# Patient Record
Sex: FEMALE | Race: WHITE | Employment: OTHER | URBAN - METROPOLITAN AREA
[De-identification: names, ages, dates, MRNs, and addresses within clinical notes are randomized per-mention and may not be internally consistent; named-entity substitution may affect disease eponyms.]

---

## 2017-03-30 ENCOUNTER — HOSPITAL ENCOUNTER (OUTPATIENT)
Dept: MAMMOGRAPHY | Age: 67
Discharge: HOME | End: 2017-03-30
Attending: INTERNAL MEDICINE
Payer: MEDICARE

## 2017-03-30 DIAGNOSIS — Z12.31 VISIT FOR SCREENING MAMMOGRAM: ICD-10-CM

## 2017-03-30 PROCEDURE — 77063 BREAST TOMOSYNTHESIS BI: CPT

## 2017-04-06 ENCOUNTER — HOSPITAL ENCOUNTER (OUTPATIENT)
Dept: MAMMOGRAPHY | Age: 67
Discharge: HOME | End: 2017-04-06
Attending: INTERNAL MEDICINE
Payer: MEDICARE

## 2017-04-06 ENCOUNTER — HOSPITAL ENCOUNTER (OUTPATIENT)
Dept: ULTRASOUND IMAGING | Age: 67
Discharge: HOME | End: 2017-04-06
Attending: INTERNAL MEDICINE
Payer: MEDICARE

## 2017-04-06 DIAGNOSIS — N63.0 BREAST NODULE: ICD-10-CM

## 2017-04-06 PROCEDURE — 77065 DX MAMMO INCL CAD UNI: CPT

## 2017-04-06 PROCEDURE — 76642 ULTRASOUND BREAST LIMITED: CPT

## 2018-05-04 ENCOUNTER — HOSPITAL ENCOUNTER (OUTPATIENT)
Dept: MAMMOGRAPHY | Age: 68
Discharge: HOME | End: 2018-05-04
Attending: INTERNAL MEDICINE
Payer: MEDICARE

## 2018-05-04 DIAGNOSIS — Z12.31 VISIT FOR SCREENING MAMMOGRAM: ICD-10-CM

## 2018-05-04 PROCEDURE — 77063 BREAST TOMOSYNTHESIS BI: CPT

## 2018-09-19 ENCOUNTER — OFFICE VISIT (OUTPATIENT)
Dept: OBGYN CLINIC | Age: 68
End: 2018-09-19

## 2018-09-19 VITALS
BODY MASS INDEX: 38.3 KG/M2 | SYSTOLIC BLOOD PRESSURE: 117 MMHG | DIASTOLIC BLOOD PRESSURE: 72 MMHG | WEIGHT: 244 LBS | HEIGHT: 67 IN | HEART RATE: 66 BPM

## 2018-09-19 DIAGNOSIS — N83.8 OVARIAN MASS, LEFT: Primary | ICD-10-CM

## 2018-09-19 PROBLEM — E66.01 SEVERE OBESITY (BMI 35.0-39.9): Status: ACTIVE | Noted: 2018-09-19

## 2018-09-19 RX ORDER — METOPROLOL SUCCINATE 25 MG/1
25 TABLET, EXTENDED RELEASE ORAL DAILY
Refills: 0 | COMMUNITY
Start: 2018-09-01

## 2018-09-19 NOTE — PROGRESS NOTES
78 y/o  with h/o left leg pain presents to the office after an ovarian mass was seen on MRI. She states she has had leg pain before and her back was   Implicated as the issue. She has received several cortisone injection, but her new physician wanted an up to date MRI. She has no abdominal symptoms of pain, bloating, dyspepsia or any other concerns. She was referred to GYN for a 5 cm fatty mass on her left ovary. Right ovary wasn't seen. Active Ambulatory Problems     Diagnosis Date Noted    Atypical glandular cells of undetermined significance (MARIA TERESA) on cervical Pap smear 10/08/2015    Cervical polyp 10/15/2015    Severe obesity (BMI 35.0-39.9) ([facility] [address] 2018     Resolved Ambulatory Problems     Diagnosis Date Noted    No Resolved Ambulatory Problems     Past Medical History:   Diagnosis Date    Hypothyroid     Menopause      Visit Vitals    /72 (BP 1 Location: Left arm, BP Patient Position: Sitting)    Pulse 66    Ht 5' 7\" (1.702 m)    Wt 244 lb (110.7 kg)    BMI 38.22 kg/m2     Gen: A&Ox3, NAD  Abdomen: obese, soft, NT  SVE: NEFG, cervix appeared normal. No blood or discharge noted  BME: small, mobile uterus, no palpable adnexal mass, no pain illicit ed on exam.    MRI reviewed in detail with the patient:   4.2x5.4x4.3 cm left ovarian mass- fat filled and concerning for dermoid tumor. Right ovary not seen    A/P  78 y/o with an asymptomatic left ovarian mass-probable dermoid. Pt. Notes it wasn't seen on her previous MRI 10 years ago. TV-U/S ordered to re-assess. RTO 2 weeks to discuss ultrasound and plan of care. The plan of care has been discussed with the patient. She has been given the opportunity to ask questions, which seem to have been answered satisfactorily.

## 2018-09-19 NOTE — PATIENT INSTRUCTIONS
Noncancerous Ovarian Growths: Care Instructions  Your Care Instructions    Ovarian growths are abnormal growths in or on the ovaries. The growth can be a cyst, which is a fluid-filled sac, or a mass (neoplasm), which is a more solid growth. Most of these growths are not cancerous (benign) and don't cause symptoms. If you have symptoms, they may include pain in the belly or pelvis, pain during your period, and abnormal bleeding. Your doctor has examined you, and you have a noncancerous ovarian growth. Women and their doctors often choose to watch these types of growths closely over time but not to treat them. This is called watchful waiting. Your doctor may prescribe medicine to help with any symptoms. In some cases, noncancerous growths may need to be removed using surgery. Follow-up care is a key part of your treatment and safety. Be sure to make and go to all appointments, and call your doctor if you are having problems. It's also a good idea to know your test results and keep a list of the medicines you take. How can you care for yourself at home? · Use heat to relax tense muscles and relieve cramping. You can use a hot water bottle, a heating pad set on low, or a warm bath. · Be safe with medicines. Take pain medicines exactly as directed. ¨ If the doctor gave you a prescription medicine for pain, take it as prescribed. ¨ If you are not taking a prescription pain medicine, ask your doctor if you can take an over-the-counter medicine. · Avoid constipation. Make sure you drink enough fluids and include fruits, vegetables, and fiber in your diet each day. Constipation does not cause ovarian cysts, but it may make your pelvic pain worse. When should you call for help?   Call your doctor now or seek immediate medical care if:    · You have severe vaginal bleeding.     · You have new or worse belly or pelvic pain.    Watch closely for changes in your health, and be sure to contact your doctor if:    · You have unusual vaginal bleeding.     · You do not get better as expected. Where can you learn more? Go to http://bere-carlos.info/. Enter [phone] in the search box to learn more about \"Noncancerous Ovarian Growths: Care Instructions. \"  Current as of: October 6, 2017  Content Version: 11.7  © 1488-0543 [employer]. Care instructions adapted under license by [employer] (which disclaims liability or warranty for this information). If you have questions about a medical condition or this instruction, always ask your healthcare professional. [place] [address] any warranty or liability for your use of this information.

## 2018-09-28 ENCOUNTER — HOSPITAL ENCOUNTER (OUTPATIENT)
Dept: ULTRASOUND IMAGING | Age: 68
Discharge: HOME | End: 2018-09-28
Attending: OBSTETRICS & GYNECOLOGY
Payer: MEDICARE

## 2018-09-28 DIAGNOSIS — N83.8 OVARIAN MASS, LEFT: ICD-10-CM

## 2018-09-28 PROCEDURE — 76830 TRANSVAGINAL US NON-OB: CPT

## 2018-10-03 ENCOUNTER — OFFICE VISIT (OUTPATIENT)
Dept: OBGYN CLINIC | Age: 68
End: 2018-10-03

## 2018-10-03 VITALS
WEIGHT: 244 LBS | HEART RATE: 96 BPM | DIASTOLIC BLOOD PRESSURE: 66 MMHG | BODY MASS INDEX: 38.3 KG/M2 | HEIGHT: 67 IN | SYSTOLIC BLOOD PRESSURE: 112 MMHG

## 2018-10-03 DIAGNOSIS — N83.8 OVARIAN MASS, LEFT: Primary | ICD-10-CM

## 2018-10-03 NOTE — PATIENT INSTRUCTIONS
Pap Test: Care Instructions  Your Care Instructions    The Pap test (also called a Pap smear) is a screening test for cancer of the cervix, which is the lower part of the uterus that opens into the vagina. The test can help your doctor find early changes in the cells that could lead to cancer. The sample of cells taken during your test has been sent to a lab so that an expert can look at the cells. It usually takes a week or two to get the results back. Follow-up care is a key part of your treatment and safety. Be sure to make and go to all appointments, and call your doctor if you are having problems. It's also a good idea to know your test results and keep a list of the medicines you take. What do the results mean? · A normal result means that the test did not find any abnormal cells in the sample. · An abnormal result can mean many things. Most of these are not cancer. The results of your test may be abnormal because:  ¨ You have an infection of the vagina or cervix, such as a yeast infection. ¨ You have an IUD (intrauterine device for birth control). ¨ You have low estrogen levels after menopause that are causing the cells to change. ¨ You have cell changes that may be a sign of precancer or cancer. The results are ranked based on how serious the changes might be. There are many other reasons why you might not get a normal result. If the results were abnormal, you may need to get another test within a few weeks or months. If the results show changes that could be a sign of cancer, you may need a test called a colposcopy, which provides a more complete view of the cervix. Sometimes the lab cannot use the sample because it does not contain enough cells or was not preserved well. If so, you may need to have the test again. This is not common, but it does happen from time to time. When should you call for help?   Watch closely for changes in your health, and be sure to contact your doctor if:    · You have vaginal bleeding or pain for more than 2 days after the test. It is normal to have a small amount of bleeding for a day or two after the test.   Where can you learn more? Go to http://bere-carlos.info/. Enter D296 in the search box to learn more about \"Pap Test: Care Instructions. \"  Current as of: May 12, 2017  Content Version: 11.7  © 2006-2018 [employer]. Care instructions adapted under license by [employer] (which disclaims liability or warranty for this information). If you have questions about a medical condition or this instruction, always ask your healthcare professional. [address] any warranty or liability for your use of this information. Endometrial Biopsy: About This Test  What is it? An endometrial biopsy is a way for your doctor to take a small sample of the lining of the uterus (endometrium). The sample is looked at under a microscope for abnormal cells. An endometrial biopsy helps your doctor find problems in the endometrium. Why is this test done? An endometrial biopsy is done to check for cancer of the uterus. The test is also done if you have abnormal bleeding from your uterus or are having problems getting pregnant. The test results show how your body's hormones are affecting the lining of the uterus. How can you prepare for the test?  Talk to your doctor about all your health conditions before the test. For example, tell your doctor if you:  · Are or might be pregnant. An endometrial biopsy is not done during pregnancy. · Are taking any medicines. · Are allergic to any medicines. · Have had bleeding problems, or if you take aspirin or some other blood thinner. · Have been treated for an infection in your pelvic area. · Have any heart or lung problems.   Other ways to prepare:  · Do not douche, use tampons, or use vaginal medicines for 24 hours before the test.  · Ask your doctor if you should take a pain reliever, such as ibuprofen (Advil or Motrin), 30 to 60 minutes before the test. This can help reduce any cramping pain that the test can cause. · Talk to your doctor if you have concerns about the need for the test, its risks, how it will be done, or what the results may mean. What happens before the test?  · You will empty your bladder just before the test.  · You will be asked to sign a consent form that says you understand the risks of the test and agree to have it done. What happens during the test?  · You will lie on an exam table. Your feet will be in stirrups. · The doctor may use a spray or injection to numb your cervix. The cervix is the opening to the uterus. · The doctor will use a tool called a speculum to see the cervix. · Then the doctor will pass a thin tube through the cervix to take a sample of the uterus lining. You may feel a sharp cramp when the doctor collects the sample. · The sample is sent to a lab. How long does the test take? The test will take about 5 to 15 minutes. What happens after the test?  · You will probably be able to go home right away. · You likely will have mild vaginal bleeding and may have cramps for a few days after the test. The cramps may feel like bad menstrual cramps. · Ask your doctor if you can take an over-the-counter pain medicine, such as acetaminophen (Tylenol), ibuprofen (Advil, Motrin), or naproxen (Aleve). Be safe with medicines. Read and follow all instructions on the label. · Do not have sex, use tampons, or douche until the spotting stops. Use pads for vaginal bleeding or discharge. · Do not do strenuous exercise or heavy lifting for one day after your biopsy. Follow-up care is a key part of your treatment and safety. Be sure to make and go to all appointments, and call your doctor if you are having problems. It's also a good idea to keep a list of the medicines you take. Ask your doctor when you can expect to have your test results.   Where can you learn more? Go to http://bere-carlos.info/. Enter [phone] in the search box to learn more about \"Endometrial Biopsy: About This Test.\"  Current as of: October 6, 2017  Content Version: 11.7  © 6371-2186 [employer], [employer]. Care instructions adapted under license by [employer] (which disclaims liability or warranty for this information). If you have questions about a medical condition or this instruction, always ask your healthcare professional. [address] any warranty or liability for your use of this information.

## 2018-10-03 NOTE — PROGRESS NOTES
76 y/o  presents to the office for follow-up. She has no complaints. She denies any vaginal bleeding or pain. No changes in her medical record. ,Blood pressure 112/66, pulse 96, height 5' 7\" (1.702 m), weight 244 lb (110.7 kg). Gen: A&OX3, NAD  Exam deferred    Ultrasound: UTERUS: Normal in size and echotexture measuring  7.0 x 4.8 x 3.9 cm. No  fibroids or masses identified.     ENDOMETRIUM: Normal in echotexture, measuring 11 millimeters     RIGHT OVARY and ADNEXA: The right ovary is nonvisualized.     LEFT OVARY and ADNEXA: The left ovary is difficult to visualized in both  transabdominal and transvaginal acoustic windows. Structure measured as the left  ovary noted with dimensions of approximately 5.9 x 4.3 x 4.8 cm. Mixed  echogenicity lesion with associated calcification present measuring  approximately 5.7 x 5.2 x 4.7 cm in size. Color Doppler and spectral imaging  demonstrates normal venous blood flow to the left ovary, without discrete  arterial waveforms noted.     OTHER: No free fluid identified. A/P:  76 y/o with a dermoid tumor- and thickened endometrium. Discussed recommendation for endometrial sampling. Will obtain tumor markers, but low concern for malignancy. RTO 2 weeks for EMB. The plan of care has been discussed with the patient. She has been given the opportunity to ask questions, which seem to have been answered satisfactorily.

## 2018-10-04 PROBLEM — R93.89 THICKENED ENDOMETRIUM: Status: ACTIVE | Noted: 2018-10-04

## 2018-10-04 PROBLEM — N83.8 OVARIAN MASS, LEFT: Status: ACTIVE | Noted: 2018-10-04

## 2018-10-17 ENCOUNTER — HOSPITAL ENCOUNTER (OUTPATIENT)
Dept: LAB | Age: 68
Discharge: HOME | End: 2018-10-17
Payer: MEDICARE

## 2018-10-17 ENCOUNTER — OFFICE VISIT (OUTPATIENT)
Dept: OBGYN CLINIC | Age: 68
End: 2018-10-17

## 2018-10-17 ENCOUNTER — HOSPITAL ENCOUNTER (OUTPATIENT)
Dept: LAB | Age: 68
Discharge: HOME | End: 2018-10-17

## 2018-10-17 VITALS
WEIGHT: 244 LBS | HEIGHT: 67 IN | DIASTOLIC BLOOD PRESSURE: 72 MMHG | HEART RATE: 81 BPM | SYSTOLIC BLOOD PRESSURE: 120 MMHG | BODY MASS INDEX: 38.3 KG/M2

## 2018-10-17 DIAGNOSIS — N95.0 POSTMENOPAUSAL BLEEDING: Primary | ICD-10-CM

## 2018-10-17 PROCEDURE — 87624 HPV HI-RISK TYP POOLED RSLT: CPT | Performed by: OBSTETRICS & GYNECOLOGY

## 2018-10-17 PROCEDURE — 99001 SPECIMEN HANDLING PT-LAB: CPT | Performed by: OBSTETRICS & GYNECOLOGY

## 2018-10-17 PROCEDURE — 88305 TISSUE EXAM BY PATHOLOGIST: CPT | Performed by: OBSTETRICS & GYNECOLOGY

## 2018-10-17 PROCEDURE — 88175 CYTOPATH C/V AUTO FLUID REDO: CPT | Performed by: OBSTETRICS & GYNECOLOGY

## 2018-10-17 NOTE — PROCEDURES
Endometrial Biopsy Procedure Note    Time Out: 1126    Endometrial biopsy was performed today. Indications: abnormal uterine bleeding    Pre-procedural pain: 0:10    Procedure Details   Urine pregnancy test was not done. The risks (including infection, bleeding, pain, and uterine perforation) and benefits of the procedure were explained to the her and written informed consent was obtained. Antibiotic prophylaxis against endocarditis was not indicated. She was placed in the dorsal lithotomy position. Bimanual exam showed the uterus to be in the neutral position. A speculum inserted in the vagina, and the cervix prepped with povidone iodine. A \"Pipelle endometrial aspirator\" was used to sample the endometrium. Sample was sent for pathologic examination. The patient tolerated the procedure well and there were no complications. Post procedure pain: 0:10  End Time: 5675    Plan:  Ms. Aydee Soto was advised to call for any fever or for prolonged or severe pain or bleeding. She was advised to use OTC ibuprofen as needed for mild to moderate pain. She was advised to avoid vaginal intercourse for 48 hours or until the bleeding has completely stopped. My office will get in touch with her as soon as we have the pathology report.

## 2018-10-17 NOTE — PROGRESS NOTES
78 y/o with a thickened endometrium and left adnexal mass most c/w fibroid, presents to the office for an EMB. She has no concerns and denies any bleeding. Visit Vitals  /72   Pulse 81   Ht 5' 7\" (1.702 m)   Wt 244 lb (110.7 kg)   BMI 38.22 kg/m²     Exam:Normal appearing cervix. Pap smear taken prior to EMB. See full procedure note for details    A/P  EMB today for thickened endometrium- asymptomatic. Will plan for laparoscopic left salping-oopherectomy. Pre-op clearacne form given. RTO 2 weeks for results. Pap also done today since not done in many years.

## 2018-10-18 LAB
CANCER AG125 SERPL-ACNC: 18.5 U/ML (ref 0–38.1)
CANCER AG19-9 SERPL-ACNC: 18 U/ML (ref 0–35)
CEA SERPL-MCNC: 11.7 NG/ML (ref 0–4.7)
LDH SERPL-CCNC: 232 IU/L (ref 119–226)

## 2018-11-05 ENCOUNTER — OFFICE VISIT (OUTPATIENT)
Dept: OBGYN CLINIC | Age: 68
End: 2018-11-05

## 2018-11-05 VITALS
HEIGHT: 67 IN | DIASTOLIC BLOOD PRESSURE: 61 MMHG | SYSTOLIC BLOOD PRESSURE: 110 MMHG | HEART RATE: 62 BPM | RESPIRATION RATE: 20 BRPM | WEIGHT: 244 LBS | BODY MASS INDEX: 38.3 KG/M2 | OXYGEN SATURATION: 100 %

## 2018-11-05 DIAGNOSIS — N83.8 OVARIAN MASS, LEFT: Primary | ICD-10-CM

## 2018-11-05 NOTE — PROGRESS NOTES
76 y/o with thickened endometrium and right ovarian mass concerning for dermoid tumor. The patient notes no concern post EMB. She denies any pain or bleeding. Her daughter is present during this visit. Visit Vitals  /61 (BP 1 Location: Left arm, BP Patient Position: Sitting)   Pulse 62   Resp 20   Ht 5' 7\" (1.702 m)   Wt 244 lb (110.7 kg)   SpO2 100%   BMI 38.22 kg/m²     Exam: deferred    Pathology:  Endometrium negative for hyperplasia or carcinoma    A/P: 76 y/o with previous abnormal pap. Recent pap smear normal.  EMB done for PMB- pathology negative. Right ovarian mass- concerning for ectopic. Will plan for removal post surgical clearance. Form given today.

## 2018-11-26 ENCOUNTER — OFFICE VISIT (OUTPATIENT)
Dept: OBGYN CLINIC | Age: 68
End: 2018-11-26

## 2018-11-26 ENCOUNTER — HOSPITAL ENCOUNTER (OUTPATIENT)
Dept: PREADMISSION TESTING | Age: 68
Discharge: HOME | End: 2018-11-26
Payer: MEDICARE

## 2018-11-26 VITALS
HEART RATE: 67 BPM | WEIGHT: 243 LBS | BODY MASS INDEX: 38.06 KG/M2 | SYSTOLIC BLOOD PRESSURE: 130 MMHG | DIASTOLIC BLOOD PRESSURE: 67 MMHG

## 2018-11-26 DIAGNOSIS — Z01.818 PRE-OP EVALUATION: Primary | ICD-10-CM

## 2018-11-26 DIAGNOSIS — Z01.818 PRE-OP EVALUATION: ICD-10-CM

## 2018-11-26 PROBLEM — I47.1 SVT (SUPRAVENTRICULAR TACHYCARDIA) (HCC): Status: ACTIVE | Noted: 2018-11-26

## 2018-11-26 LAB
ABO + RH BLD: NORMAL
BLOOD GROUP ANTIBODIES SERPL: NORMAL
SPECIMEN EXP DATE BLD: NORMAL

## 2018-11-26 PROCEDURE — 36415 COLL VENOUS BLD VENIPUNCTURE: CPT

## 2018-11-26 PROCEDURE — 86901 BLOOD TYPING SEROLOGIC RH(D): CPT

## 2018-11-26 RX ORDER — LEVOTHYROXINE SODIUM 125 UG/1
125 TABLET ORAL DAILY
Refills: 0 | COMMUNITY
Start: 2018-09-06 | End: 2019-08-29

## 2018-11-26 RX ORDER — METFORMIN HYDROCHLORIDE 500 MG/1
500 TABLET ORAL 2 TIMES DAILY WITH MEALS
COMMUNITY

## 2018-11-26 NOTE — PERIOP NOTES
PAT - SURGICAL PRE-ADMISSION INSTRUCTIONS 
 
NAME:  Deangelo Carlton                                                          TODAY'S DATE:  11/26/2018 SURGERY DATE:  11/28/2018                                  SURGERY ARRIVAL TIME:   0900 1. Do NOT eat or drink anything, including candy or gum, after MIDNIGHT on 11/27/18 , unless you have specific instructions from your Surgeon or Anesthesia Provider to do so. 2. No smoking on the day of surgery. 3. No alcohol 24 hours prior to the day of surgery. 4. No recreational drugs for one week prior to the day of surgery. 5. Leave all valuables, including money/purse, at home. 6. Remove all jewelry, nail polish, makeup (including mascara); no lotions, powders, deodorant, or perfume/cologne/after shave. 7. Glasses/Contact lenses and Dentures may be worn to the hospital.  They will be removed prior to surgery. 8. Call your doctor if symptoms of a cold or illness develop within 24 ours prior to surgery. 9. AN ADULT MUST DRIVE YOU HOME AFTER OUTPATIENT SURGERY. 10. If you are having an OUTPATIENT procedure, please make arrangements for a responsible adult to be with you for 24 hours after your surgery. 11. If you are admitted to the hospital, you will be assigned to a bed after surgery is complete. Normally a family member will not be able to see you until you are in your assigned bed. 15. Family is encouraged to accompany you to the hospital.  We ask visitors in the treatment area to be limited to ONE person at a time to ensure patient privacy. EXCEPTIONS WILL BE MADE AS NEEDED. 15. Children under 12 are discouraged from entering the treatment area and need to be supervised by an adult when in the waiting room. Special Instructions: Take these medications the morning of surgery with a sip of water:  Thyroid Patient Prep: 
 
use CHG solution These surgical instructions were reviewed with Bry Bridges during the PAT visit. A printed copy of the instructions was provided to MAUREEN. Directions: On the morning of surgery, please go to the [address] [facility]. Enter the building from the [facility] entrance, 1st floor (next to the Emergency Room entrance). Take the elevator to the 2nd floor. Sign in at the Registration Desk. If you have any questions and/or concerns, please do not hesitate to call: 
(Prior to the day of surgery)  [facility] unit:  [phone] (Day of surgery)  [facility] unit:  [phone]

## 2018-11-27 ENCOUNTER — ANESTHESIA EVENT (OUTPATIENT)
Dept: SURGERY | Age: 68
End: 2018-11-27
Payer: MEDICARE

## 2018-11-27 NOTE — H&P (VIEW-ONLY)
Preoperative Evaluation Date of Exam: 11/26/2018 Soheila Jacobo is a 77 y.o. female ([phone]) who presents for preoperative evaluation. Procedure/Surgery:Diagnostic hysteroscopy and Dilation and curretage; Diagnostic laparoscopy; Left salping-oopherectomy Date of Procedure/Surgery: November 28, 2018 Surgeon: CASIMIRO Centeno MD 
Hospital/Surgical Facility: [facility] Primary Physician: Mendel Centers, MD 
Latex Allergy: no 
 
Problem List:    
Patient Active Problem List  
 Diagnosis Date Noted  SVT (supraventricular tachycardia) ([facility] [address] 11/26/2018  Thickened endometrium 10/04/2018  Ovarian mass, left 10/04/2018  Severe obesity (BMI 35.0-39.9) 09/19/2018  Cervical polyp 10/15/2015  Atypical glandular cells of undetermined significance (MARIA TERESA) on cervical Pap smear 10/08/2015 Medical History:    
Past Medical History:  
Diagnosis Date  Diabetes ([facility] [address]  Hypercholesteremia  Hypertension  Hypothyroid  Menopause  Ovarian mass, left 10/4/2018  SVT (supraventricular tachycardia) ([facility] [address] 11/26/2018  Thickened endometrium 10/4/2018 Allergies:   No Known Allergies Medications:    
Current Outpatient Medications Medication Sig  
 metFORMIN (GLUCOPHAGE) 500 mg tablet Take 500 mg by mouth daily (with breakfast).  levothyroxine (SYNTHROID) 125 mcg tablet Take 125 mcg by mouth daily.  metoprolol succinate (TOPROL-XL) 25 mg XL tablet Take 25 mg by mouth daily.  simvastatin (ZOCOR) 20 mg tablet No current facility-administered medications for this visit. Surgical History:    
Past Surgical History:  
Procedure Laterality Date  HX TONSILLECTOMY Social History:    
Social History Socioeconomic History  Marital status:  Spouse name: Not on file  Number of children: Not on file  Years of education: Not on file  Highest education level: Not on file Tobacco Use  Smoking status: Current Some Day Smoker Packs/day: 1.00 Years: 56.00 Pack years: 56.00  Smokeless tobacco: Never Used Substance and Sexual Activity  Alcohol use: No  
  Alcohol/week: 0.0 oz  Drug use: No  
 Sexual activity: No  
  Birth control/protection: None Recent use of: No recent use of aspirin (ASA), NSAIDS or steroids Tetanus up to date: tetanus status unknown to the patient Anesthesia Complications: None History of abnormal bleeding : None History of Blood Transfusions: no 
Health Care Directive or Living Will: no 
 
REVIEW OF SYSTEMS: 
A comprehensive review of systems was negative except for that written in the HPI. EXAM:  
Visit Vitals /67 (BP 1 Location: Left arm, BP Patient Position: Sitting) Pulse 67 Wt 243 lb (110.2 kg) BMI 38.06 kg/m² General appearance - alert, well appearing, and in no distress, normal appearing weight and overweight Mental status - alert, oriented to person, place, and time Chest - clear to auscultation, no wheezes, rales or rhonchi, symmetric air entry Heart - normal rate, regular rhythm, normal S1, S2, no murmurs, rubs, clicks or gallops Abdomen - soft, nontender, nondistended, no masses or organomegaly Pelvic - deferred Extremities - peripheral pulses normal, no pedal edema, no clubbing or cyanosis Skin - normal coloration and turgor, no rashes, no suspicious skin lesions noted DIAGNOSTICS:  
1. EKG: EKG FINDINGS - unchanged from previous tracings, sinus tachycardia 2. CXR: deferred 3. Labs: ABO/Rh(D) Date Value Ref Range Status 11/26/2018 A POSITIVE  Final  
 
Labs: 
See pre-op clearance in media IMPRESSION:  
Dermoid tumor- long standing. Will plan for laparoscopic removal. 
Thickened endometrium-postmenopausal.  
Pt. Consented for Diagnostic laparoscopy and hysteroscopy. Pt has been cleared by the PCP. Pre and postoperative instructions reviewed reviewed. The plan of care has been discussed with the patient. She has been given the opportunity to ask questions, which seem to have been answered satisfactorily. No contraindications to planned surgery Irene Lee MD  
11/26/2018

## 2018-11-27 NOTE — PROGRESS NOTES
Preoperative Evaluation    Date of Exam: 2018    Antonio Jesus is a 77 y.o. female (:1950) who presents for preoperative evaluation. Procedure/Surgery:Diagnostic hysteroscopy and Dilation and curretage; Diagnostic laparoscopy; Left salping-oopherectomy  Date of Procedure/Surgery: 2018  Surgeon: CASIMIRO Leigh MD  Hospital/Surgical Facility: [facility]  Primary Physician: Josh Morgan MD  Latex Allergy: no    Problem List:     Patient Active Problem List    Diagnosis Date Noted    SVT (supraventricular tachycardia) ([facility] 2018    Thickened endometrium 10/04/2018    Ovarian mass, left 10/04/2018    Severe obesity (BMI 35.0-39.9) 2018    Cervical polyp 10/15/2015    Atypical glandular cells of undetermined significance ([facility]) on cervical Pap smear 10/08/2015     Medical History:     Past Medical History:   Diagnosis Date    Diabetes ([facility]     Hypercholesteremia     Hypertension     Hypothyroid     Menopause     Ovarian mass, left 10/4/2018    SVT (supraventricular tachycardia) ([facility] [facility] 2018    Thickened endometrium 10/4/2018     Allergies:   No Known Allergies   Medications:     Current Outpatient Medications   Medication Sig    metFORMIN (GLUCOPHAGE) 500 mg tablet Take 500 mg by mouth daily (with breakfast).  levothyroxine (SYNTHROID) 125 mcg tablet Take 125 mcg by mouth daily.  metoprolol succinate (TOPROL-XL) 25 mg XL tablet Take 25 mg by mouth daily.  simvastatin (ZOCOR) 20 mg tablet      No current facility-administered medications for this visit.       Surgical History:     Past Surgical History:   Procedure Laterality Date    HX TONSILLECTOMY       Social History:     Social History     Socioeconomic History    Marital status:      Spouse name: Not on file    Number of children: Not on file    Years of education: Not on file    Highest education level: Not on file   Tobacco Use    Smoking status: Current Some Day Smoker Packs/day: 1.00     Years: 56.00     Pack years: 56.00    Smokeless tobacco: Never Used   Substance and Sexual Activity    Alcohol use: No     Alcohol/week: 0.0 oz    Drug use: No    Sexual activity: No     Birth control/protection: None       Recent use of: No recent use of aspirin (ASA), NSAIDS or steroids    Tetanus up to date: tetanus status unknown to the patient      Anesthesia Complications: None  History of abnormal bleeding : None  History of Blood Transfusions: no  Health Care Directive or Living Will: no    REVIEW OF SYSTEMS:  A comprehensive review of systems was negative except for that written in the HPI. EXAM:   Visit Vitals  /67 (BP 1 Location: Left arm, BP Patient Position: Sitting)   Pulse 67   Wt 243 lb (110.2 kg)   BMI 38.06 kg/m²     General appearance - alert, well appearing, and in no distress, normal appearing weight and overweight  Mental status - alert, oriented to person, place, and time  Chest - clear to auscultation, no wheezes, rales or rhonchi, symmetric air entry  Heart - normal rate, regular rhythm, normal S1, S2, no murmurs, rubs, clicks or gallops  Abdomen - soft, nontender, nondistended, no masses or organomegaly  Pelvic - deferred  Extremities - peripheral pulses normal, no pedal edema, no clubbing or cyanosis  Skin - normal coloration and turgor, no rashes, no suspicious skin lesions noted      DIAGNOSTICS:   1. EKG: EKG FINDINGS - unchanged from previous tracings, sinus tachycardia  2. CXR: deferred  3. Labs:  ABO/Rh(D)   Date Value Ref Range Status   11/26/2018 A POSITIVE  Final     Labs:  See pre-op clearance in media    IMPRESSION:   Dermoid tumor- long standing. Will plan for laparoscopic removal.  Thickened endometrium-postmenopausal.   Pt. Consented for Diagnostic laparoscopy and hysteroscopy. Pt has been cleared by the PCP. Pre and postoperative instructions reviewed reviewed. The plan of care has been discussed with the patient.   She has been given the opportunity to ask questions, which seem to have been answered satisfactorily.     No contraindications to planned surgery    Taniya Dooley MD   11/26/2018

## 2018-11-28 ENCOUNTER — HOSPITAL ENCOUNTER (OUTPATIENT)
Age: 68
Setting detail: OUTPATIENT SURGERY
Discharge: HOME | End: 2018-11-28
Attending: OBSTETRICS & GYNECOLOGY | Admitting: OBSTETRICS & GYNECOLOGY
Payer: MEDICARE

## 2018-11-28 ENCOUNTER — ANESTHESIA (OUTPATIENT)
Dept: SURGERY | Age: 68
End: 2018-11-28
Payer: MEDICARE

## 2018-11-28 VITALS
SYSTOLIC BLOOD PRESSURE: 117 MMHG | WEIGHT: 237 LBS | BODY MASS INDEX: 37.2 KG/M2 | TEMPERATURE: 97.4 F | DIASTOLIC BLOOD PRESSURE: 53 MMHG | HEIGHT: 67 IN | RESPIRATION RATE: 18 BRPM | OXYGEN SATURATION: 96 % | HEART RATE: 69 BPM

## 2018-11-28 DIAGNOSIS — Z98.890 S/P LAPAROSCOPY: Primary | ICD-10-CM

## 2018-11-28 PROBLEM — E11.9 CONTROLLED TYPE 2 DIABETES MELLITUS WITHOUT COMPLICATION (HCC): Status: ACTIVE | Noted: 2018-11-28

## 2018-11-28 LAB
ANION GAP SERPL CALC-SCNC: 9 MMOL/L (ref 3–18)
BASOPHILS # BLD: 0 K/UL (ref 0–0.1)
BASOPHILS NFR BLD: 0 % (ref 0–2)
BUN SERPL-MCNC: 18 MG/DL (ref 7–18)
BUN/CREAT SERPL: 20 (ref 12–20)
CALCIUM SERPL-MCNC: 8.6 MG/DL (ref 8.5–10.1)
CHLORIDE SERPL-SCNC: 108 MMOL/L (ref 100–108)
CO2 SERPL-SCNC: 26 MMOL/L (ref 21–32)
CREAT SERPL-MCNC: 0.88 MG/DL (ref 0.6–1.3)
DIFFERENTIAL METHOD BLD: NORMAL
EOSINOPHIL # BLD: 0.1 K/UL (ref 0–0.4)
EOSINOPHIL NFR BLD: 1 % (ref 0–5)
ERYTHROCYTE [DISTWIDTH] IN BLOOD BY AUTOMATED COUNT: 14 % (ref 11.6–14.5)
GLUCOSE BLD STRIP.AUTO-MCNC: 160 MG/DL (ref 70–110)
GLUCOSE SERPL-MCNC: 142 MG/DL (ref 74–99)
HCT VFR BLD AUTO: 42.7 % (ref 35–45)
HGB BLD-MCNC: 14.3 G/DL (ref 12–16)
LYMPHOCYTES # BLD: 3.5 K/UL (ref 0.9–3.6)
LYMPHOCYTES NFR BLD: 40 % (ref 21–52)
MCH RBC QN AUTO: 31 PG (ref 24–34)
MCHC RBC AUTO-ENTMCNC: 33.5 G/DL (ref 31–37)
MCV RBC AUTO: 92.4 FL (ref 74–97)
MONOCYTES # BLD: 0.8 K/UL (ref 0.05–1.2)
MONOCYTES NFR BLD: 9 % (ref 3–10)
NEUTS SEG # BLD: 4.3 K/UL (ref 1.8–8)
NEUTS SEG NFR BLD: 50 % (ref 40–73)
PLATELET # BLD AUTO: 254 K/UL (ref 135–420)
PMV BLD AUTO: 10.6 FL (ref 9.2–11.8)
POTASSIUM SERPL-SCNC: 4.3 MMOL/L (ref 3.5–5.5)
RBC # BLD AUTO: 4.62 M/UL (ref 4.2–5.3)
SODIUM SERPL-SCNC: 143 MMOL/L (ref 136–145)
WBC # BLD AUTO: 8.7 K/UL (ref 4.6–13.2)

## 2018-11-28 PROCEDURE — 88309 TISSUE EXAM BY PATHOLOGIST: CPT

## 2018-11-28 PROCEDURE — 74011000250 HC RX REV CODE- 250: Performed by: OBSTETRICS & GYNECOLOGY

## 2018-11-28 PROCEDURE — 77030016151 HC PROTCTR LNS DFOG COVD -B: Performed by: OBSTETRICS & GYNECOLOGY

## 2018-11-28 PROCEDURE — 74011000250 HC RX REV CODE- 250

## 2018-11-28 PROCEDURE — 74011250637 HC RX REV CODE- 250/637

## 2018-11-28 PROCEDURE — 88307 TISSUE EXAM BY PATHOLOGIST: CPT

## 2018-11-28 PROCEDURE — 74011250636 HC RX REV CODE- 250/636: Performed by: NURSE ANESTHETIST, CERTIFIED REGISTERED

## 2018-11-28 PROCEDURE — 74011250637 HC RX REV CODE- 250/637: Performed by: NURSE ANESTHETIST, CERTIFIED REGISTERED

## 2018-11-28 PROCEDURE — C1765 ADHESION BARRIER: HCPCS | Performed by: OBSTETRICS & GYNECOLOGY

## 2018-11-28 PROCEDURE — 74011250636 HC RX REV CODE- 250/636

## 2018-11-28 PROCEDURE — 77030002933 HC SUT MCRYL J&J -A: Performed by: OBSTETRICS & GYNECOLOGY

## 2018-11-28 PROCEDURE — 77030032151 HC HYSTSCP FLD MGMT KT DISP S&N -D: Performed by: OBSTETRICS & GYNECOLOGY

## 2018-11-28 PROCEDURE — 77030035048 HC TRCR ENDOSC OPTCL COVD -B: Performed by: OBSTETRICS & GYNECOLOGY

## 2018-11-28 PROCEDURE — 77030032490 HC SLV COMPR SCD KNE COVD -B: Performed by: OBSTETRICS & GYNECOLOGY

## 2018-11-28 PROCEDURE — 77030008603 HC TRCR ENDOSC EPATH J&J -C: Performed by: OBSTETRICS & GYNECOLOGY

## 2018-11-28 PROCEDURE — 77030018720 HC DVC LIGSR ATLS COVD -E: Performed by: OBSTETRICS & GYNECOLOGY

## 2018-11-28 PROCEDURE — 77030033137 HC TBNG OUTFLO AQUILEX ST HOLO -B: Performed by: OBSTETRICS & GYNECOLOGY

## 2018-11-28 PROCEDURE — 74011250636 HC RX REV CODE- 250/636: Performed by: OBSTETRICS & GYNECOLOGY

## 2018-11-28 PROCEDURE — 77030008518 HC TBNG INSUF ENDO STRY -B: Performed by: OBSTETRICS & GYNECOLOGY

## 2018-11-28 PROCEDURE — 77030009852 HC PCH RTVR ENDOSC COVD -B: Performed by: OBSTETRICS & GYNECOLOGY

## 2018-11-28 PROCEDURE — 77030035051: Performed by: OBSTETRICS & GYNECOLOGY

## 2018-11-28 PROCEDURE — 77030018842 HC SOL IRR SOD CL 9% BAXT -A: Performed by: OBSTETRICS & GYNECOLOGY

## 2018-11-28 PROCEDURE — 77030033136 HC TBNG INFLO AQUILEX ST HOLO -C: Performed by: OBSTETRICS & GYNECOLOGY

## 2018-11-28 PROCEDURE — 82962 GLUCOSE BLOOD TEST: CPT

## 2018-11-28 PROCEDURE — 85025 COMPLETE CBC W/AUTO DIFF WBC: CPT

## 2018-11-28 PROCEDURE — 76010000135 HC OR TIME 4 TO 4.5 HR: Performed by: OBSTETRICS & GYNECOLOGY

## 2018-11-28 PROCEDURE — 77030008598 HC TRCR ENDOSC BLDLS J&J -B: Performed by: OBSTETRICS & GYNECOLOGY

## 2018-11-28 PROCEDURE — 76060000039 HC ANESTHESIA 4 TO 4.5 HR: Performed by: OBSTETRICS & GYNECOLOGY

## 2018-11-28 PROCEDURE — 77030026236 HC DEV TISS RMVL MYOSUR HOLO -G1: Performed by: OBSTETRICS & GYNECOLOGY

## 2018-11-28 PROCEDURE — 77030031139 HC SUT VCRL2 J&J -A: Performed by: OBSTETRICS & GYNECOLOGY

## 2018-11-28 PROCEDURE — 93005 ELECTROCARDIOGRAM TRACING: CPT

## 2018-11-28 PROCEDURE — 76210000006 HC OR PH I REC 0.5 TO 1 HR: Performed by: OBSTETRICS & GYNECOLOGY

## 2018-11-28 PROCEDURE — 76210000021 HC REC RM PH II 0.5 TO 1 HR: Performed by: OBSTETRICS & GYNECOLOGY

## 2018-11-28 PROCEDURE — 77030034850: Performed by: OBSTETRICS & GYNECOLOGY

## 2018-11-28 PROCEDURE — 80048 BASIC METABOLIC PNL TOTAL CA: CPT

## 2018-11-28 PROCEDURE — C1782 MORCELLATOR: HCPCS | Performed by: OBSTETRICS & GYNECOLOGY

## 2018-11-28 PROCEDURE — 77030008756 HC TU IRR SUC STRY -B: Performed by: OBSTETRICS & GYNECOLOGY

## 2018-11-28 PROCEDURE — 88305 TISSUE EXAM BY PATHOLOGIST: CPT

## 2018-11-28 RX ORDER — ALBUTEROL SULFATE 90 UG/1
AEROSOL, METERED RESPIRATORY (INHALATION) AS NEEDED
Status: DISCONTINUED | OUTPATIENT
Start: 2018-11-28 | End: 2018-11-28 | Stop reason: HOSPADM

## 2018-11-28 RX ORDER — GLYCOPYRROLATE 0.2 MG/ML
INJECTION INTRAMUSCULAR; INTRAVENOUS AS NEEDED
Status: DISCONTINUED | OUTPATIENT
Start: 2018-11-28 | End: 2018-11-28 | Stop reason: HOSPADM

## 2018-11-28 RX ORDER — VECURONIUM BROMIDE FOR INJECTION 1 MG/ML
INJECTION, POWDER, LYOPHILIZED, FOR SOLUTION INTRAVENOUS AS NEEDED
Status: DISCONTINUED | OUTPATIENT
Start: 2018-11-28 | End: 2018-11-28 | Stop reason: HOSPADM

## 2018-11-28 RX ORDER — INSULIN LISPRO 100 [IU]/ML
INJECTION, SOLUTION INTRAVENOUS; SUBCUTANEOUS ONCE
Status: DISCONTINUED | OUTPATIENT
Start: 2018-11-28 | End: 2018-11-28 | Stop reason: HOSPADM

## 2018-11-28 RX ORDER — PROPOFOL 10 MG/ML
INJECTION, EMULSION INTRAVENOUS AS NEEDED
Status: DISCONTINUED | OUTPATIENT
Start: 2018-11-28 | End: 2018-11-28 | Stop reason: HOSPADM

## 2018-11-28 RX ORDER — LIDOCAINE HYDROCHLORIDE 10 MG/ML
0.1 INJECTION, SOLUTION EPIDURAL; INFILTRATION; INTRACAUDAL; PERINEURAL AS NEEDED
Status: DISCONTINUED | OUTPATIENT
Start: 2018-11-28 | End: 2018-11-28 | Stop reason: HOSPADM

## 2018-11-28 RX ORDER — BUPIVACAINE HYDROCHLORIDE AND EPINEPHRINE 5; 5 MG/ML; UG/ML
INJECTION, SOLUTION EPIDURAL; INTRACAUDAL; PERINEURAL AS NEEDED
Status: DISCONTINUED | OUTPATIENT
Start: 2018-11-28 | End: 2018-11-28 | Stop reason: HOSPADM

## 2018-11-28 RX ORDER — SODIUM CHLORIDE 0.9 % (FLUSH) 0.9 %
5-10 SYRINGE (ML) INJECTION AS NEEDED
Status: DISCONTINUED | OUTPATIENT
Start: 2018-11-28 | End: 2018-11-28 | Stop reason: HOSPADM

## 2018-11-28 RX ORDER — LIDOCAINE HYDROCHLORIDE 20 MG/ML
INJECTION, SOLUTION EPIDURAL; INFILTRATION; INTRACAUDAL; PERINEURAL AS NEEDED
Status: DISCONTINUED | OUTPATIENT
Start: 2018-11-28 | End: 2018-11-28 | Stop reason: HOSPADM

## 2018-11-28 RX ORDER — EPHEDRINE SULFATE 50 MG/ML
INJECTION, SOLUTION INTRAVENOUS AS NEEDED
Status: DISCONTINUED | OUTPATIENT
Start: 2018-11-28 | End: 2018-11-28 | Stop reason: HOSPADM

## 2018-11-28 RX ORDER — MIDAZOLAM HYDROCHLORIDE 1 MG/ML
INJECTION, SOLUTION INTRAMUSCULAR; INTRAVENOUS AS NEEDED
Status: DISCONTINUED | OUTPATIENT
Start: 2018-11-28 | End: 2018-11-28 | Stop reason: HOSPADM

## 2018-11-28 RX ORDER — CEFAZOLIN SODIUM 2 G/50ML
2 SOLUTION INTRAVENOUS
Status: COMPLETED | OUTPATIENT
Start: 2018-11-28 | End: 2018-11-28

## 2018-11-28 RX ORDER — FENTANYL CITRATE 50 UG/ML
INJECTION, SOLUTION INTRAMUSCULAR; INTRAVENOUS AS NEEDED
Status: DISCONTINUED | OUTPATIENT
Start: 2018-11-28 | End: 2018-11-28 | Stop reason: HOSPADM

## 2018-11-28 RX ORDER — LIDOCAINE HYDROCHLORIDE 20 MG/ML
INJECTION, SOLUTION EPIDURAL; INFILTRATION; INTRACAUDAL; PERINEURAL AS NEEDED
Status: DISCONTINUED | OUTPATIENT
Start: 2018-11-28 | End: 2018-11-28

## 2018-11-28 RX ORDER — OXYCODONE AND ACETAMINOPHEN 5; 325 MG/1; MG/1
1-2 TABLET ORAL
Qty: 30 TAB | Refills: 0 | Status: SHIPPED | OUTPATIENT
Start: 2018-11-28 | End: 2019-08-29

## 2018-11-28 RX ORDER — SODIUM CHLORIDE, SODIUM LACTATE, POTASSIUM CHLORIDE, CALCIUM CHLORIDE 600; 310; 30; 20 MG/100ML; MG/100ML; MG/100ML; MG/100ML
25 INJECTION, SOLUTION INTRAVENOUS CONTINUOUS
Status: DISCONTINUED | OUTPATIENT
Start: 2018-11-28 | End: 2018-11-28 | Stop reason: HOSPADM

## 2018-11-28 RX ORDER — FENTANYL CITRATE 50 UG/ML
25 INJECTION, SOLUTION INTRAMUSCULAR; INTRAVENOUS AS NEEDED
Status: DISCONTINUED | OUTPATIENT
Start: 2018-11-28 | End: 2018-11-28 | Stop reason: HOSPADM

## 2018-11-28 RX ORDER — HYDROMORPHONE HYDROCHLORIDE 2 MG/ML
INJECTION, SOLUTION INTRAMUSCULAR; INTRAVENOUS; SUBCUTANEOUS AS NEEDED
Status: DISCONTINUED | OUTPATIENT
Start: 2018-11-28 | End: 2018-11-28 | Stop reason: HOSPADM

## 2018-11-28 RX ORDER — NEOSTIGMINE METHYLSULFATE 5 MG/5 ML
SYRINGE (ML) INTRAVENOUS AS NEEDED
Status: DISCONTINUED | OUTPATIENT
Start: 2018-11-28 | End: 2018-11-28 | Stop reason: HOSPADM

## 2018-11-28 RX ORDER — IBUPROFEN 800 MG/1
800 TABLET ORAL
Qty: 30 TAB | Refills: 0 | Status: SHIPPED | OUTPATIENT
Start: 2018-11-28 | End: 2019-08-29

## 2018-11-28 RX ORDER — ONDANSETRON 2 MG/ML
INJECTION INTRAMUSCULAR; INTRAVENOUS AS NEEDED
Status: DISCONTINUED | OUTPATIENT
Start: 2018-11-28 | End: 2018-11-28 | Stop reason: HOSPADM

## 2018-11-28 RX ORDER — KETOROLAC TROMETHAMINE 30 MG/ML
INJECTION, SOLUTION INTRAMUSCULAR; INTRAVENOUS AS NEEDED
Status: DISCONTINUED | OUTPATIENT
Start: 2018-11-28 | End: 2018-11-28 | Stop reason: HOSPADM

## 2018-11-28 RX ORDER — DEXTROSE MONOHYDRATE 25 G/50ML
25-50 INJECTION, SOLUTION INTRAVENOUS AS NEEDED
Status: DISCONTINUED | OUTPATIENT
Start: 2018-11-28 | End: 2018-11-28 | Stop reason: HOSPADM

## 2018-11-28 RX ORDER — CEFAZOLIN SODIUM 1 G/3ML
INJECTION, POWDER, FOR SOLUTION INTRAMUSCULAR; INTRAVENOUS
Status: DISCONTINUED
Start: 2018-11-28 | End: 2018-11-28 | Stop reason: HOSPADM

## 2018-11-28 RX ORDER — FAMOTIDINE 20 MG/1
20 TABLET, FILM COATED ORAL ONCE
Status: COMPLETED | OUTPATIENT
Start: 2018-11-28 | End: 2018-11-28

## 2018-11-28 RX ORDER — MAGNESIUM SULFATE 100 %
4 CRYSTALS MISCELLANEOUS AS NEEDED
Status: DISCONTINUED | OUTPATIENT
Start: 2018-11-28 | End: 2018-11-28 | Stop reason: HOSPADM

## 2018-11-28 RX ORDER — DEXAMETHASONE SODIUM PHOSPHATE 4 MG/ML
INJECTION, SOLUTION INTRA-ARTICULAR; INTRALESIONAL; INTRAMUSCULAR; INTRAVENOUS; SOFT TISSUE AS NEEDED
Status: DISCONTINUED | OUTPATIENT
Start: 2018-11-28 | End: 2018-11-28 | Stop reason: HOSPADM

## 2018-11-28 RX ADMIN — SODIUM CHLORIDE, SODIUM LACTATE, POTASSIUM CHLORIDE, AND CALCIUM CHLORIDE: 600; 310; 30; 20 INJECTION, SOLUTION INTRAVENOUS at 11:41

## 2018-11-28 RX ADMIN — LIDOCAINE HYDROCHLORIDE 80 MG: 20 INJECTION, SOLUTION EPIDURAL; INFILTRATION; INTRACAUDAL; PERINEURAL at 11:51

## 2018-11-28 RX ADMIN — ALBUTEROL SULFATE 8 PUFF: 90 AEROSOL, METERED RESPIRATORY (INHALATION) at 15:21

## 2018-11-28 RX ADMIN — MIDAZOLAM HYDROCHLORIDE 2 MG: 1 INJECTION, SOLUTION INTRAMUSCULAR; INTRAVENOUS at 11:41

## 2018-11-28 RX ADMIN — PROPOFOL 40 MG: 10 INJECTION, EMULSION INTRAVENOUS at 13:21

## 2018-11-28 RX ADMIN — PROPOFOL 50 MG: 10 INJECTION, EMULSION INTRAVENOUS at 11:55

## 2018-11-28 RX ADMIN — EPHEDRINE SULFATE 5 MG: 50 INJECTION, SOLUTION INTRAVENOUS at 11:58

## 2018-11-28 RX ADMIN — PROPOFOL 30 MG: 10 INJECTION, EMULSION INTRAVENOUS at 11:52

## 2018-11-28 RX ADMIN — EPHEDRINE SULFATE 5 MG: 50 INJECTION, SOLUTION INTRAVENOUS at 12:05

## 2018-11-28 RX ADMIN — SODIUM CHLORIDE, SODIUM LACTATE, POTASSIUM CHLORIDE, AND CALCIUM CHLORIDE: 600; 310; 30; 20 INJECTION, SOLUTION INTRAVENOUS at 14:51

## 2018-11-28 RX ADMIN — Medication 3 MG: at 15:21

## 2018-11-28 RX ADMIN — PROPOFOL 20 MG: 10 INJECTION, EMULSION INTRAVENOUS at 13:29

## 2018-11-28 RX ADMIN — FENTANYL CITRATE 100 MCG: 50 INJECTION, SOLUTION INTRAMUSCULAR; INTRAVENOUS at 11:41

## 2018-11-28 RX ADMIN — KETOROLAC TROMETHAMINE 30 MG: 30 INJECTION, SOLUTION INTRAMUSCULAR; INTRAVENOUS at 13:23

## 2018-11-28 RX ADMIN — GLYCOPYRROLATE 0.4 MG: 0.2 INJECTION INTRAMUSCULAR; INTRAVENOUS at 15:21

## 2018-11-28 RX ADMIN — EPHEDRINE SULFATE 5 MG: 50 INJECTION, SOLUTION INTRAVENOUS at 14:05

## 2018-11-28 RX ADMIN — FAMOTIDINE 20 MG: 20 TABLET ORAL at 10:25

## 2018-11-28 RX ADMIN — VECURONIUM BROMIDE FOR INJECTION 2.6 MG: 1 INJECTION, POWDER, LYOPHILIZED, FOR SOLUTION INTRAVENOUS at 11:55

## 2018-11-28 RX ADMIN — SODIUM CHLORIDE, SODIUM LACTATE, POTASSIUM CHLORIDE, AND CALCIUM CHLORIDE 25 ML/HR: 600; 310; 30; 20 INJECTION, SOLUTION INTRAVENOUS at 10:22

## 2018-11-28 RX ADMIN — PROPOFOL 20 MG: 10 INJECTION, EMULSION INTRAVENOUS at 13:43

## 2018-11-28 RX ADMIN — EPHEDRINE SULFATE 5 MG: 50 INJECTION, SOLUTION INTRAVENOUS at 12:00

## 2018-11-28 RX ADMIN — PROPOFOL 20 MG: 10 INJECTION, EMULSION INTRAVENOUS at 13:25

## 2018-11-28 RX ADMIN — PROPOFOL 20 MG: 10 INJECTION, EMULSION INTRAVENOUS at 13:40

## 2018-11-28 RX ADMIN — ONDANSETRON 4 MG: 2 INJECTION INTRAMUSCULAR; INTRAVENOUS at 12:02

## 2018-11-28 RX ADMIN — PROPOFOL 20 MG: 10 INJECTION, EMULSION INTRAVENOUS at 13:38

## 2018-11-28 RX ADMIN — PROPOFOL 120 MG: 10 INJECTION, EMULSION INTRAVENOUS at 11:53

## 2018-11-28 RX ADMIN — EPHEDRINE SULFATE 5 MG: 50 INJECTION, SOLUTION INTRAVENOUS at 11:56

## 2018-11-28 RX ADMIN — EPHEDRINE SULFATE 5 MG: 50 INJECTION, SOLUTION INTRAVENOUS at 14:11

## 2018-11-28 RX ADMIN — CEFAZOLIN SODIUM 2 G: 2 SOLUTION INTRAVENOUS at 12:07

## 2018-11-28 RX ADMIN — HYDROMORPHONE HYDROCHLORIDE 0.25 MG: 2 INJECTION, SOLUTION INTRAMUSCULAR; INTRAVENOUS; SUBCUTANEOUS at 13:45

## 2018-11-28 RX ADMIN — PROPOFOL 20 MG: 10 INJECTION, EMULSION INTRAVENOUS at 13:33

## 2018-11-28 RX ADMIN — HYDROMORPHONE HYDROCHLORIDE 0.25 MG: 2 INJECTION, SOLUTION INTRAMUSCULAR; INTRAVENOUS; SUBCUTANEOUS at 13:36

## 2018-11-28 RX ADMIN — PROPOFOL 20 MG: 10 INJECTION, EMULSION INTRAVENOUS at 13:36

## 2018-11-28 RX ADMIN — DEXAMETHASONE SODIUM PHOSPHATE 8 MG: 4 INJECTION, SOLUTION INTRA-ARTICULAR; INTRALESIONAL; INTRAMUSCULAR; INTRAVENOUS; SOFT TISSUE at 12:02

## 2018-11-28 RX ADMIN — EPHEDRINE SULFATE 5 MG: 50 INJECTION, SOLUTION INTRAVENOUS at 12:02

## 2018-11-28 NOTE — OP NOTES
Outpatient Operative Note     Surgeon(s): David Scott MD  Assistant: Bhavesh Root SA  Pre-operative Diagnosis: Thickened endometrium; Left ovarian mass  Post-operative Diagnosis: same as preop diagnosis. Procedure(s) Performed: Laparoscopic Left Salpingo-oopheretomy,  Diagnostic hysteroscopy; Polypectomy using Myosure system     Anesthesia: General  Findings: large left ovary with normal uterus and right ovary and tube. Large endometrial polyp  Complications: none   Estimated Blood Loss: Minimal  Specimens: Left ovary and tube; endometrial polyp and currettings    Procedure:   Patient was taken to the OR after informed consent had been obtained. Surgery identification was completed with the patient and the OR team. She was then placed under general anesthesia, prepped and draped in a sterile fashion. A joiner catheter was placed in her bladder. Patient identification and surgery identification were completed with the surgeon and the OR team. Attention was turned to the vagina and a sponge on a stick was placed. Attention was then turned to the abdomen. 7 cc of 0.25% Marcaine was injected into the umbilicus and a 5 mm incision was made. The trocar was entered directly and pneumoperitoneum was achieved with 15  liters of CO2 gas. An OptiView port was placed under direct visualization without complication. High-flow insufflation was initiated and the patient was placed in steep [place]. Right and left lower quadrant trocars were then placed by first injecting 0.25 % Marcaine and making a 5 mm incision. A 10 mm non-bladed trocar was inserted on the left under laparoscopic visualization without injury to the underlying contents. The same procedure was used on the right. The abdomen was examined and images were obtained. The ureters was identified on the left. The left IP ligament was then transected near the left ovary distant from the left ureter.    The remaining attachments to the left  pelvic sidewall were transected. Interceed was placed at the incision site. Excellent homeostasis was verified. The ovary was retrieved from the abdomen with an endopouch and sent to pathology after the left lower quadrant incision was extended. The abdomen was cleared of all clot and debris. Hemostasis was noted at all transection sites. Bilateral peristalsis was confirmed in the ureters. The instruments were removed from the abdomen. Hemostasis was noted at the port sites. The left lower quadrant incision fascia was closed using 0-vicryl on a running suture. The skin was closed with 4-0 Monocryl in a subcuticlar fashion and Dermabond was placed over the incisions. Attention was turned to the vagina and a weighted speculum was placed. The anterior lip of the cervix was grasped with a single toothed tenaculum. The uterus was sounded to 8 cm. The cervix was serially dilated to allow passage of a 5 mm diagnostic hysteroscope. Hysteroscopy was performed with the above noted findings. A large polyp was noted and the 10 mm scope advanced. There were several failed attempts to use the Truclear device and ultimately the Myosure system was used to morcellate the polyp  pecimen was sent for permanent pathology. Hysteroscopy was repeated with the hysteroscope. Hysteroscope was removed and all other instruments were removed. Hysteroscopy in 700 :out was in:555 output. Sponge, lap, needle and instrument counts were correct x 2. Estimated blood loss was <10 cc. The patient was awoken from anesthesia and transferred to the recovery room in stable condition. The sponge on a stick was removed vaginally. The joiner catheter was removed from her bladder. Sponge, lap, needle and instrument counts were correct x 2. The patient was awakened and returned to recovery in stable condition.      Hudson Mera MD  November 28, 2018

## 2018-11-28 NOTE — INTERVAL H&P NOTE
H&P Update: 
Castro Helm was seen and examined. Labs reviewed. History and physical has been reviewed. The patient has been examined.  There have been no significant clinical changes since the completion of the originally dated History and Physical. 
 
Signed By: Charlene Martel MD   
 November 28, 2018 11:35 AM

## 2018-11-28 NOTE — PERIOP NOTES
Recd care of pt from OR via stretcher. Resp even and unlabored. Attached to monitor. VSS. OR, MAR and anesthesia report acknowledged. 1550  Accu check 160. No coverage per anesthesia. Pt and dtr both state that pt 150-170s per anesthesia.

## 2018-11-28 NOTE — PERIOP NOTES
Phase 2 Recovery Summary Patient arrived to Phase 2 at 1644. Report received from Cathay Rubinstein, RN Patient denies any pain in at incision site. Patient does have some discomfort in right eye. Consulted with Dr. Yashira Diaz who evaluated patient. Advised patient to contact Dr. Jyotsna Cai tomorrow if it doesn't feel better, however in the mean time apply eye patch and let heal. Eye patch applied and secured with paper tape. Extra eye patch and paper tape given to daughter. No other questions at this time. Patient voided at 1700 100 ml and at 1730 another 100 ml. Patient does not have any discomfort voiding. Vitals:  
 11/28/18 1545 11/28/18 1547 11/28/18 1550 11/28/18 1552 BP: 124/64  116/64 124/64 Pulse:  66 69 74 Resp:  14 14 10 Temp:    97.1 °F (36.2 °C) SpO2: 96% 95% 94% 96% Weight:      
Height:      
 
 
oriented to time, place, person and situation Lines and Drains Peripheral Intravenous Line:  
Peripheral IV 11/28/18 Right Hand (Active) Site Assessment Clean, dry, & intact 11/28/2018 10:19 AM  
Phlebitis Assessment 0 11/28/2018 10:19 AM  
Infiltration Assessment 0 11/28/2018 10:19 AM  
Dressing Status Clean, dry, & intact 11/28/2018 10:19 AM  
Dressing Type Tape;Transparent 11/28/2018 10:19 AM  
Hub Color/Line Status Infusing;Pink 11/28/2018 10:19 AM  
Action Taken Open ports on tubing capped 11/28/2018 10:19 AM  
Alcohol Cap Used Yes 11/28/2018 10:19 AM  
 
 
Wound Wound Abdomen (Active) DRESSING STATUS Clean, dry, and intact 11/28/2018  3:23 PM  
DRESSING TYPE Topical skin adhesive/glue 11/28/2018  3:23 PM  
Incision site well approximated? Yes 11/28/2018  3:23 PM  
Number of days: 0 Patient discharged to home with daughter at 200.  
 
Delio Ram RN

## 2018-11-28 NOTE — DISCHARGE INSTRUCTIONS
DISCHARGE SUMMARY from Nurse    PATIENT INSTRUCTIONS:    After general anesthesia or intravenous sedation, for 24 hours or while taking prescription Narcotics:  · Limit your activities  · Do not drive and operate hazardous machinery  · Do not make important personal or business decisions  · Do  not drink alcoholic beverages  · If you have not urinated within 8 hours after discharge, please contact your surgeon on call. Report the following to your surgeon:  · Excessive pain, swelling, redness or odor of or around the surgical area  · Temperature over 100.5  · Nausea and vomiting lasting longer than 4 hours or if unable to take medications  · Any signs of decreased circulation or nerve impairment to extremity: change in color, persistent  numbness, tingling, coldness or increase pain  · Any questions    What to do at Home:  Recommended activity: Activity as tolerated and no driving for today,     These are general instructions for a healthy lifestyle:    No smoking/ No tobacco products/ Avoid exposure to second hand smoke  Surgeon General's Warning:  Quitting smoking now greatly reduces serious risk to your health. Obesity, smoking, and sedentary lifestyle greatly increases your risk for illness    A healthy diet, regular physical exercise & weight monitoring are important for maintaining a healthy lifestyle    You may be retaining fluid if you have a history of heart failure or if you experience any of the following symptoms:  Weight gain of 3 pounds or more overnight or 5 pounds in a week, increased swelling in our hands or feet or shortness of breath while lying flat in bed. Please call your doctor as soon as you notice any of these symptoms; do not wait until your next office visit.     Recognize signs and symptoms of STROKE:    F-face looks uneven    A-arms unable to move or move unevenly    S-speech slurred or non-existent    T-time-call 911 as soon as signs and symptoms begin-DO NOT go       Back to bed or wait to see if you get better-TIME IS BRAIN. Warning Signs of HEART ATTACK     Call 911 if you have these symptoms:   Chest discomfort. Most heart attacks involve discomfort in the center of the chest that lasts more than a few minutes, or that goes away and comes back. It can feel like uncomfortable pressure, squeezing, fullness, or pain.  Discomfort in other areas of the upper body. Symptoms can include pain or discomfort in one or both arms, the back, neck, jaw, or stomach.  Shortness of breath with or without chest discomfort.  Other signs may include breaking out in a cold sweat, nausea, or lightheadedness. Don't wait more than five minutes to call 911 - MINUTES MATTER! Fast action can save your life. Calling 911 is almost always the fastest way to get lifesaving treatment. Emergency Medical Services staff can begin treatment when they arrive -- up to an hour sooner than if someone gets to the hospital by car. The discharge information has been reviewed with the patient. The patient verbalized understanding. Discharge medications reviewed with the patient and appropriate educational materials and side effects teaching were provided. ___________________________________________________________________________________________________________________________________     Learning About Oophorectomy  What is oophorectomy? Oophorectomy (say \"hb-pe-nih-REK-tuh-kareem\") is surgery to take out one or both of your ovaries. Your ovaries store and release eggs so that you can get pregnant. They also make female sex hormones. Why is it done? You may have this surgery to:  · Treat cancer of the ovaries. · Treat severe endometriosis. · Treat problems with your ovary. These include a cyst, a growth, an abscess, or a twisted ovary. · Reduce your risk of cancer of the breasts or ovaries. Some women have a higher risk of these cancers. These women have a gene change called BRCA (say \"FAB-mario\"). Taking out the ovaries and fallopian tubes greatly reduces the cancer risk for women with this gene change. Some women have their uterus and ovaries taken out at the same time. In some cases, the fallopian tubes are also removed. How is this surgery done? The type of surgery you have depends on why you are having it done. You may have:  · Laparoscopic surgery. This type is often used to remove ovaries to reduce your cancer risk. It's also used to treat an abscess, a cyst, twisting, or endometriosis. It is done with very small cuts in your belly. The doctor then puts a lighted tube, or scope, and other special tools through the cuts in your belly. · Open surgery (laparotomy). This type is often used when laparoscopy isn't appropriate. It may also be used if there is a chance you have ovarian cancer. With open surgery, the doctor makes a larger cut in the belly. It takes longer to recover from this surgery than from laparoscopy. · Vaginal surgery. In this surgery, the ovaries and uterus are taken out through the vagina. Doctors may use a laparoscope during the surgery. What can you expect after surgery? · If you had laparoscopic surgery, you may go home the same day. If you had open surgery, you will stay in the hospital for 2 to 3 days. · Taking out your ovaries makes you start menopause. You may have hot flashes and vaginal dryness. You may urinate more often. And you may lose interest in sex. Also your risk is greater for other diseases, such as heart disease and osteoporosis. Medicine may help with all of these symptoms. · When both of your ovaries are gone, you can't get pregnant. · Taking out the ovaries does not always prevent cancer. Follow-up care is a key part of your treatment and safety. Be sure to make and go to all appointments, and call your doctor if you are having problems. It's also a good idea to know your test results and keep a list of the medicines you take.   Where can you learn more?  Go to http://bere-carlos.info/. Enter [phone] in the search box to learn more about \"Learning About Oophorectomy. \"  Current as of: May 15, 2018  Content Version: 11.8  © 0007-9865 [employer]. Care instructions adapted under license by [employer] (which disclaims liability or warranty for this information). If you have questions about a medical condition or this instruction, always ask your healthcare professional. [place] [address] any warranty or liability for your use of this information. Patient armband removed and given to patient to take home.   Patient was informed of the privacy risks if armband lost or stolen

## 2018-11-28 NOTE — ANESTHESIA PREPROCEDURE EVALUATION
Anesthetic History No history of anesthetic complications Review of Systems / Medical History Patient summary reviewed, nursing notes reviewed and pertinent labs reviewed Pulmonary Within defined limits Neuro/Psych Within defined limits Cardiovascular Hypertension Dysrhythmias : SVT Exercise tolerance: >4 METS: H/o SVT per EMR. GI/Hepatic/Renal 
  
 
 
 
 
 
 Endo/Other Diabetes: type 2 Hypothyroidism: well controlled Morbid obesity Comments: dx'ed w/ DM 2 wk ago -> FSBG runs 150-170's per family Other Findings Physical Exam 
 
Airway Mallampati: III 
TM Distance: 4 - 6 cm Neck ROM: short neck Cardiovascular Regular rate and rhythm,  S1 and S2 normal,  no murmur, click, rub, or gallop Rhythm: regular Rate: normal 
 
 
 
 Dental 
 
Dentition: Bridges and Full upper dentures Pulmonary Breath sounds clear to auscultation Abdominal 
GI exam deferred Other Findings Anesthetic Plan ASA: 3 Anesthesia type: general 
 
 
 
 
Induction: Intravenous Anesthetic plan and risks discussed with: Patient and Family

## 2018-11-28 NOTE — PERIOP NOTES
Pts daughter, Jamar Álvarez, in waiting room. Caryl Rosales for info. She has pts belongings [phone]

## 2018-11-28 NOTE — ANESTHESIA POSTPROCEDURE EVALUATION
Procedure(s): 
diagnostic  LAPAROSCOPIC left SALPINGO-OOPHORECTOMY, diagnostic hysteroscopy and polypectomy. Anesthesia Post Evaluation Multimodal analgesia: multimodal analgesia used between 6 hours prior to anesthesia start to PACU discharge Patient location during evaluation: bedside Patient participation: waiting for patient participation Level of consciousness: sleepy but conscious Pain score: 0 Airway patency: patent Anesthetic complications: no 
Cardiovascular status: acceptable Respiratory status: acceptable Hydration status: acceptable Post anesthesia nausea and vomiting:  none Visit Vitals /64 Pulse 74 Temp 36.2 °C (97.1 °F) Resp 10 Ht 5' 7\" (1.702 m) Wt 107.5 kg (237 lb) SpO2 96% BMI 37.12 kg/m²

## 2018-11-29 LAB
ATRIAL RATE: 52 BPM
CALCULATED P AXIS, ECG09: 65 DEGREES
CALCULATED R AXIS, ECG10: -34 DEGREES
CALCULATED T AXIS, ECG11: 76 DEGREES
DIAGNOSIS, 93000: NORMAL
P-R INTERVAL, ECG05: 202 MS
Q-T INTERVAL, ECG07: 514 MS
QRS DURATION, ECG06: 134 MS
QTC CALCULATION (BEZET), ECG08: 478 MS
VENTRICULAR RATE, ECG03: 52 BPM

## 2019-04-04 ENCOUNTER — HOSPITAL ENCOUNTER (OUTPATIENT)
Dept: GENERAL RADIOLOGY | Age: 69
Discharge: HOME | End: 2019-04-04
Payer: MEDICARE

## 2019-04-04 DIAGNOSIS — R05.9 COUGH: ICD-10-CM

## 2019-04-04 PROCEDURE — 71046 X-RAY EXAM CHEST 2 VIEWS: CPT

## 2019-07-31 ENCOUNTER — OFFICE VISIT (OUTPATIENT)
Dept: SURGERY | Age: 69
End: 2019-07-31

## 2019-07-31 ENCOUNTER — HOSPITAL ENCOUNTER (OUTPATIENT)
Dept: MAMMOGRAPHY | Age: 69
Discharge: HOME | End: 2019-07-31
Attending: INTERNAL MEDICINE
Payer: MEDICARE

## 2019-07-31 VITALS
SYSTOLIC BLOOD PRESSURE: 129 MMHG | RESPIRATION RATE: 20 BRPM | TEMPERATURE: 96.6 F | WEIGHT: 243 LBS | DIASTOLIC BLOOD PRESSURE: 71 MMHG | HEART RATE: 56 BPM | BODY MASS INDEX: 38.14 KG/M2 | HEIGHT: 67 IN

## 2019-07-31 DIAGNOSIS — K43.2 INCISIONAL HERNIA, WITHOUT OBSTRUCTION OR GANGRENE: Primary | ICD-10-CM

## 2019-07-31 DIAGNOSIS — Z12.39 BREAST SCREENING, UNSPECIFIED: ICD-10-CM

## 2019-07-31 PROCEDURE — 77063 BREAST TOMOSYNTHESIS BI: CPT

## 2019-07-31 NOTE — PATIENT INSTRUCTIONS
If you have any questions or concerns about today's appointment, the verbal and/or written instructions you were given for follow up care, please call our office at [phone]. [facility] [id number] [place] [address], [address] 
 
[phone] office [phone] fax [facility] PATIENT PRE AND POST OPERATIVE INSTRUCTIONS [address] [place] [address] Road 
[phone] Before Surgery Instructions:  
1) You must have someone available to drive you to and from your procedure and stay with you for the first 24 hours. 2) It is very important that you have nothing to eat or drink after midnight the night before your surgery. This includes chewing gum or sucking on hard candy. Take only heart, blood pressure and cholesterol medications the morning of surgery with only a sip of water. 3) Please stop taking Plavix 10 days prior to your surgery. Stop taking Coumadin 5 days prior to your surgery. Stop taking all Aspirin or Aspirin containing products 7 days prior to your surgery. Stop taking Advil, Motrin, Aleve, and etc. 3 days prior to your surgery. 4) If you take any diabetic medications please consult with your primary care physician on how to take them on the day of your surgery 5) Please stop all Herbal products 2 weeks prior to your surgery. 6) Please arrive at the hospital 2 hours prior to your surgery, unless you have been otherwise instructed. Any required labs/urine drug screen/x-rays will be performed on day of surgery. 7) If you are of child bearing age you will have pregnancy test done the morning of your surgery as soon as you arrive. 8) Patients having an operation on their colon will be given a separate instruction sheet on their Bowel Prep. 9) For any pre-operative work up check in at the main entrance to [address] [place] [address], and then go to Patient Registration.  These studies are done on a walk in basis they are open from 7:00am to 5:00pm Monday through Friday. 10) Please wash your surgical site the morning of your surgery with antibacterial (i.e. Dial) soap and water. 11) You will be contacted by a hospital preadmission nurse approximately one week prior to scheduled surgery to discuss additional instructions and to review your medications. 12) You may be contacted to change your surgery time. At times this is necessary due to equipment, staffing needs or in the event of a cancellation. Surgery Date and Time:  Tuesday, September 3, 2019 at 11:30am 
 
Please check in at [facility], enter through the Emergency Room entrance and go up to the second floor. Please check in by 9:30am the day of your surgery. After Surgery Instructions: You will need to be seen in the office for a follow-up visit 7-14 days after your surgery. Please call after you have had the procedure to make this appointment. Unless otherwise instructed, you may remove your outer bandage and shower 48 hours after your surgery. If you develop a fever greater than 101, have any significant drainage, bleeding, swelling and/or pus of the wound. Please call our office immediately. You may contact [place] [facility] with any questions at [phone].

## 2019-07-31 NOTE — PROGRESS NOTES
Bhumika Lamas is a 68 y.o. female who presents today with   Chief Complaint   Patient presents with    Abdominal Pain     Pt presents today for evaluation of possible hernia. 1. Have you been to the ER, urgent care clinic since your last visit? Hospitalized since your last visit? No    2. Have you seen or consulted any other health care providers outside of the [address] [place] [address] since your last visit? Include any pap smears or colon screening.  No

## 2019-07-31 NOTE — PROGRESS NOTES
General Surgery Consult      Julito Kessler  Admit date: (Not on file)    MRN: [id number]     : 1950     Age: 68 y.o. Attending Physician: Dariusz Au MD, [facility]      History of Present Illness:     Julito Kessler is a 68 y.o. female who was referred to me for evaluation of a symptomatic incisional hernia. The patient had a laparoscopic left oophorectomy at the end of last year. She stated after the surgery she had bronchitis with severe cough that was relatively chronic. She stated that she felt a sudden pain around the umbilicus and toward the right side and then she saw the bulge. She stated that the bulge has been there since then and has been there for about couple months at least. She denies severe pain but she has discomfort especially at the site of the bulge. She describes the pain as dull and intermittent. She stated that the mass is non reducible. She is morbidly obese. Patient Active Problem List    Diagnosis Date Noted    S/P laparoscopy 2018    Controlled type 2 diabetes mellitus without complication ([address]     SVT (supraventricular tachycardia) ([address] 2018    Thickened endometrium 10/04/2018    Ovarian mass, left 10/04/2018    Severe obesity (BMI 35.0-39.9) 2018    Cervical polyp 10/15/2015    Atypical glandular cells of undetermined significance ([facility]) on cervical Pap smear 10/08/2015     Past Medical History:   Diagnosis Date    Diabetes ([address]     Hypercholesteremia     Hypertension     Hypothyroid     Menopause     Ovarian mass, left 10/4/2018    S/P laparoscopy 2018    SVT (supraventricular tachycardia) ([address] 2018    TB (tuberculosis)     diagnosed and treated at age 1    Thickened endometrium 10/4/2018      Past Surgical History:   Procedure Laterality Date    HX GYN  2018    Left Salpingo-oopheroectomy and Dx hysteroscopy.      HX TONSILLECTOMY        Social History     Tobacco Use    Smoking status: Current Some Day Smoker     Packs/day: 1.00     Years: 56.00     Pack years: 56.00    Smokeless tobacco: Never Used   Substance Use Topics    Alcohol use: No     Alcohol/week: 0.0 standard drinks      Social History     Tobacco Use   Smoking Status Current Some Day Smoker    Packs/day: 1.00    Years: 56.00    Pack years: 56.00   Smokeless Tobacco Never Used     Family History   Problem Relation Age of Onset    Breast Cancer Sister 46    Breast Cancer Sister       Current Outpatient Medications   Medication Sig    metFORMIN (GLUCOPHAGE) 500 mg tablet Take 500 mg by mouth daily (with breakfast).  levothyroxine (SYNTHROID) 125 mcg tablet Take 125 mcg by mouth daily.  metoprolol succinate (TOPROL-XL) 25 mg XL tablet Take 25 mg by mouth daily.  simvastatin (ZOCOR) 20 mg tablet     oxyCODONE-acetaminophen (PERCOCET) 5-325 mg per tablet Take 1-2 Tabs by mouth every six (6) hours as needed for Pain. Max Daily Amount: 8 Tabs.  ibuprofen (MOTRIN) 800 mg tablet Take 1 Tab by mouth every eight (8) hours as needed for Pain. No current facility-administered medications for this visit.        No Known Allergies     Review of Systems:  Constitutional: negative  Eyes: negative  Ears, Nose, Mouth, Throat, and Face: negative  Respiratory: negative  Cardiovascular: negative  Gastrointestinal: positive for abdominal pain and Abdominal wall bulge  Genitourinary:negative  Integument/Breast: negative  Hematologic/Lymphatic: negative  Musculoskeletal:negative  Neurological: negative  Behavioral/Psychiatric: negative  Endocrine: negative  Allergic/Immunologic: negative    Objective:     Visit Vitals  /71 (BP 1 Location: Right arm, BP Patient Position: At rest)   Pulse (!) 56   Temp 96.6 °F (35.9 °C) (Oral)   Resp 20   Ht 5' 7\" (1.702 m)   Wt 110.2 kg (243 lb)   BMI 38.06 kg/m²       Physical Exam:      General:  in no apparent distress, alert, oriented times 3 and cooperative   Eyes:  conjunctivae and sclerae normal, pupils equal, round, reactive to light   Throat & Neck: no erythema or exudates noted and neck supple and symmetrical; no palpable masses   Lungs:   clear to auscultation bilaterally   Heart:  Regular rate and rhythm   Abdomen:   rounded, soft, nontender except for tenderness at a bulge located in the right lower quadrant at the site of the scar from her previous laparoscopic oophorectomy, nondistended, no other masses or organomegaly. The hernia is nontender but only partially reducible. Extremities: extremities normal, atraumatic, no cyanosis or edema   Skin: Normal.       Imaging and Lab Review:     CBC:   Lab Results   Component Value Date/Time    WBC 8.7 11/28/2018 10:20 AM    RBC 4.62 11/28/2018 10:20 AM    HGB 14.3 11/28/2018 10:20 AM    HCT 42.7 11/28/2018 10:20 AM    PLATELET 156 73/81/9119 10:20 AM     BMP:   Lab Results   Component Value Date/Time    Glucose 142 (H) 11/28/2018 10:20 AM    Sodium 143 11/28/2018 10:20 AM    Potassium 4.3 11/28/2018 10:20 AM    Chloride 108 11/28/2018 10:20 AM    CO2 26 11/28/2018 10:20 AM    BUN 18 11/28/2018 10:20 AM    Creatinine 0.88 11/28/2018 10:20 AM    Calcium 8.6 11/28/2018 10:20 AM     CMP:  Lab Results   Component Value Date/Time    Glucose 142 (H) 11/28/2018 10:20 AM    Sodium 143 11/28/2018 10:20 AM    Potassium 4.3 11/28/2018 10:20 AM    Chloride 108 11/28/2018 10:20 AM    CO2 26 11/28/2018 10:20 AM    BUN 18 11/28/2018 10:20 AM    Creatinine 0.88 11/28/2018 10:20 AM    Calcium 8.6 11/28/2018 10:20 AM    Anion gap 9 11/28/2018 10:20 AM    BUN/Creatinine ratio 20 11/28/2018 10:20 AM       No results found for this or any previous visit (from the past 24 hour(s)). images and reports reviewed    Assessment:   Pravin Ken is a 68 y.o. female is presenting with a picture of symptomatic incisional hernia in the right lower quadrant.  I Discussed the possibility of incarceration, strangulation, enlargement in size over time, and the risk of emergency surgery in the face of strangulation. I also discussed the use of prosthetic materials (mesh), including the risk of infection. Also discussed the risk of surgery including recurrence and the possible need for reoperation and removal of mesh if used, possibility of postoperative small bowel injury, obstruction or ileus, and the risks of general anesthetic. I explained to the the patient about the robotic hernia repair procedure. Plan:     1. Schedule for robotic incisional hernia repair with placement of mesh. 2. No heavy lifting for 2 weeks after the surgery (More than 15 pounds)  3. Avoid constipation by taking stool softener.      Please call me if you have any questions (cell phone: [phone])     Signed By: Eula Bhatti MD     July 31, 2019

## 2019-08-08 ENCOUNTER — TELEPHONE (OUTPATIENT)
Dept: SURGERY | Age: 69
End: 2019-08-08

## 2019-08-08 NOTE — TELEPHONE ENCOUNTER
Left voice mail message for Ms. Madhav to contact our office re:  Scheduling a pre op clearance with Dr. Varsha Benton (PCP).   Appointment scheduled on Friday, August 23, 2019 at 10:45am

## 2019-08-29 RX ORDER — LEVOTHYROXINE SODIUM 137 UG/1
TABLET ORAL
Refills: 0 | COMMUNITY
Start: 2019-07-31

## 2019-08-29 NOTE — PERIOP NOTES
PAT - SURGICAL PRE-ADMISSION INSTRUCTIONS    NAME:  José Corey                                                          TODAY'S DATE:  8/29/2019    SURGERY DATE:  9/3/2019                                  SURGERY ARRIVAL TIME:   TBV    1. Do NOT eat or drink anything, including candy or gum, after MIDNIGHT on 09/02/2019 , unless you have specific instructions from your Surgeon or Anesthesia Provider to do so. 2. No smoking on the day of surgery. 3. No alcohol 24 hours prior to the day of surgery. 4. No recreational drugs for one week prior to the day of surgery. 5. Leave all valuables, including money/purse, at home. 6. Remove all jewelry, nail polish, makeup (including mascara); no lotions, powders, deodorant, or perfume/cologne/after shave. 7. Glasses/Contact lenses and Dentures may be worn to the hospital.  They will be removed prior to surgery. 8. Call your doctor if symptoms of a cold or illness develop within 24 ours prior to surgery. 9. AN ADULT MUST DRIVE YOU HOME AFTER OUTPATIENT SURGERY. 10. If you are having an OUTPATIENT procedure, please make arrangements for a responsible adult to be with you for 24 hours after your surgery. 11. If you are admitted to the hospital, you will be assigned to a bed after surgery is complete. Normally a family member will not be able to see you until you are in your assigned bed. 15. Family is encouraged to accompany you to the hospital.  We ask visitors in the treatment area to be limited to ONE person at a time to ensure patient privacy. EXCEPTIONS WILL BE MADE AS NEEDED. 15. Children under 12 are discouraged from entering the treatment area and need to be supervised by an adult when in the waiting room. Special Instructions: Take these medications the morning of surgery with a sip of water:  Metoprolol, Levothyroxine, HOLD oral diabetic medication on the MORNING OF surgery. , HOLD metformin/glucophage dose starting the EVENING BEFORE the day of surgery. Patient Prep:    shower with anti-bacterial soap    These surgical instructions were reviewed with Zofia Arrington during the PAT phone call. Directions: On the morning of surgery, please go to the [address] [facility]. Enter the building from the [facility] entrance, 1st floor (next to the Emergency Room entrance). Take the elevator to the 2nd floor. Sign in at the Registration Desk.     If you have any questions and/or concerns, please do not hesitate to call:  (Prior to the day of surgery)  [facility] unit:  [phone]  (Day of surgery)  [facility] unit:  [phone]

## 2019-08-30 ENCOUNTER — ANESTHESIA EVENT (OUTPATIENT)
Dept: SURGERY | Age: 69
End: 2019-08-30
Payer: MEDICARE

## 2019-09-03 ENCOUNTER — ANESTHESIA (OUTPATIENT)
Dept: SURGERY | Age: 69
End: 2019-09-03
Payer: MEDICARE

## 2019-09-03 ENCOUNTER — HOSPITAL ENCOUNTER (OUTPATIENT)
Age: 69
Setting detail: OUTPATIENT SURGERY
Discharge: HOME | End: 2019-09-03
Attending: SURGERY | Admitting: SURGERY
Payer: MEDICARE

## 2019-09-03 VITALS
SYSTOLIC BLOOD PRESSURE: 128 MMHG | BODY MASS INDEX: 36.78 KG/M2 | TEMPERATURE: 97 F | OXYGEN SATURATION: 98 % | RESPIRATION RATE: 16 BRPM | WEIGHT: 234.31 LBS | HEIGHT: 67 IN | DIASTOLIC BLOOD PRESSURE: 63 MMHG | HEART RATE: 52 BPM

## 2019-09-03 DIAGNOSIS — Z98.890 S/P HERNIA REPAIR: Primary | ICD-10-CM

## 2019-09-03 DIAGNOSIS — Z87.19 S/P HERNIA REPAIR: Primary | ICD-10-CM

## 2019-09-03 LAB
ATRIAL RATE: 60 BPM
CALCULATED P AXIS, ECG09: 68 DEGREES
CALCULATED R AXIS, ECG10: -49 DEGREES
CALCULATED T AXIS, ECG11: 89 DEGREES
DIAGNOSIS, 93000: NORMAL
GLUCOSE BLD STRIP.AUTO-MCNC: 142 MG/DL (ref 70–110)
GLUCOSE BLD STRIP.AUTO-MCNC: 163 MG/DL (ref 70–110)
HBA1C MFR BLD: 7.5 % (ref 4.2–5.6)
P-R INTERVAL, ECG05: 196 MS
Q-T INTERVAL, ECG07: 484 MS
QRS DURATION, ECG06: 130 MS
QTC CALCULATION (BEZET), ECG08: 484 MS
VENTRICULAR RATE, ECG03: 60 BPM

## 2019-09-03 PROCEDURE — 76010000934 HC OR TIME 1 TO 1.5HR INTENSV - TIER 2: Performed by: SURGERY

## 2019-09-03 PROCEDURE — C1781 MESH (IMPLANTABLE): HCPCS | Performed by: SURGERY

## 2019-09-03 PROCEDURE — 77030010507 HC ADH SKN DERMBND J&J -B: Performed by: SURGERY

## 2019-09-03 PROCEDURE — 93005 ELECTROCARDIOGRAM TRACING: CPT

## 2019-09-03 PROCEDURE — 74011250636 HC RX REV CODE- 250/636

## 2019-09-03 PROCEDURE — 74011250636 HC RX REV CODE- 250/636: Performed by: NURSE ANESTHETIST, CERTIFIED REGISTERED

## 2019-09-03 PROCEDURE — 76060000033 HC ANESTHESIA 1 TO 1.5 HR: Performed by: SURGERY

## 2019-09-03 PROCEDURE — 76210000006 HC OR PH I REC 0.5 TO 1 HR: Performed by: SURGERY

## 2019-09-03 PROCEDURE — 74011000250 HC RX REV CODE- 250

## 2019-09-03 PROCEDURE — 83036 HEMOGLOBIN GLYCOSYLATED A1C: CPT

## 2019-09-03 PROCEDURE — 77030008683 HC TU ET CUF COVD -A: Performed by: ANESTHESIOLOGY

## 2019-09-03 PROCEDURE — 77030035277 HC OBTRTR BLDELSS DISP INTU -B: Performed by: SURGERY

## 2019-09-03 PROCEDURE — 77030018842 HC SOL IRR SOD CL 9% BAXT -A: Performed by: SURGERY

## 2019-09-03 PROCEDURE — 74011250636 HC RX REV CODE- 250/636: Performed by: SURGERY

## 2019-09-03 PROCEDURE — 74011636637 HC RX REV CODE- 636/637

## 2019-09-03 PROCEDURE — 77030008477 HC STYL SATN SLP COVD -A: Performed by: ANESTHESIOLOGY

## 2019-09-03 PROCEDURE — 74011250637 HC RX REV CODE- 250/637: Performed by: NURSE ANESTHETIST, CERTIFIED REGISTERED

## 2019-09-03 PROCEDURE — 77030022704 HC SUT VLOC COVD -B: Performed by: SURGERY

## 2019-09-03 PROCEDURE — 74011000272 HC RX REV CODE- 272: Performed by: SURGERY

## 2019-09-03 PROCEDURE — 77030031139 HC SUT VCRL2 J&J -A: Performed by: SURGERY

## 2019-09-03 PROCEDURE — 76210000020 HC REC RM PH II FIRST 0.5 HR: Performed by: SURGERY

## 2019-09-03 PROCEDURE — 74011000250 HC RX REV CODE- 250: Performed by: SURGERY

## 2019-09-03 PROCEDURE — 82962 GLUCOSE BLOOD TEST: CPT

## 2019-09-03 PROCEDURE — 77030020703 HC SEAL CANN DISP INTU -B: Performed by: SURGERY

## 2019-09-03 PROCEDURE — 77030002933 HC SUT MCRYL J&J -A: Performed by: SURGERY

## 2019-09-03 PROCEDURE — 77030032490 HC SLV COMPR SCD KNE COVD -B: Performed by: SURGERY

## 2019-09-03 DEVICE — VENTRALIGHT ST MESH, 4" X 6" (10.2 CM X 15.2 CM), ELLIPSE
Type: IMPLANTABLE DEVICE | Site: ABDOMEN | Status: FUNCTIONAL
Brand: VENTRALIGHT

## 2019-09-03 RX ORDER — FAMOTIDINE 20 MG/1
20 TABLET, FILM COATED ORAL ONCE
Status: COMPLETED | OUTPATIENT
Start: 2019-09-03 | End: 2019-09-03

## 2019-09-03 RX ORDER — METOPROLOL TARTRATE 5 MG/5ML
INJECTION INTRAVENOUS AS NEEDED
Status: DISCONTINUED | OUTPATIENT
Start: 2019-09-03 | End: 2019-09-03 | Stop reason: HOSPADM

## 2019-09-03 RX ORDER — FENTANYL CITRATE 50 UG/ML
INJECTION, SOLUTION INTRAMUSCULAR; INTRAVENOUS AS NEEDED
Status: DISCONTINUED | OUTPATIENT
Start: 2019-09-03 | End: 2019-09-03 | Stop reason: HOSPADM

## 2019-09-03 RX ORDER — HYDROMORPHONE HYDROCHLORIDE 2 MG/ML
0.5 INJECTION, SOLUTION INTRAMUSCULAR; INTRAVENOUS; SUBCUTANEOUS
Status: DISCONTINUED | OUTPATIENT
Start: 2019-09-03 | End: 2019-09-03 | Stop reason: HOSPADM

## 2019-09-03 RX ORDER — KETOROLAC TROMETHAMINE 30 MG/ML
INJECTION, SOLUTION INTRAMUSCULAR; INTRAVENOUS AS NEEDED
Status: DISCONTINUED | OUTPATIENT
Start: 2019-09-03 | End: 2019-09-03 | Stop reason: HOSPADM

## 2019-09-03 RX ORDER — MAGNESIUM SULFATE 100 %
4 CRYSTALS MISCELLANEOUS AS NEEDED
Status: DISCONTINUED | OUTPATIENT
Start: 2019-09-03 | End: 2019-09-03 | Stop reason: HOSPADM

## 2019-09-03 RX ORDER — MIDAZOLAM HYDROCHLORIDE 1 MG/ML
INJECTION, SOLUTION INTRAMUSCULAR; INTRAVENOUS AS NEEDED
Status: DISCONTINUED | OUTPATIENT
Start: 2019-09-03 | End: 2019-09-03 | Stop reason: HOSPADM

## 2019-09-03 RX ORDER — FENTANYL CITRATE 50 UG/ML
25 INJECTION, SOLUTION INTRAMUSCULAR; INTRAVENOUS
Status: DISCONTINUED | OUTPATIENT
Start: 2019-09-03 | End: 2019-09-03 | Stop reason: HOSPADM

## 2019-09-03 RX ORDER — INSULIN LISPRO 100 [IU]/ML
INJECTION, SOLUTION INTRAVENOUS; SUBCUTANEOUS ONCE
Status: DISCONTINUED | OUTPATIENT
Start: 2019-09-03 | End: 2019-09-03 | Stop reason: HOSPADM

## 2019-09-03 RX ORDER — SODIUM CHLORIDE, SODIUM LACTATE, POTASSIUM CHLORIDE, CALCIUM CHLORIDE 600; 310; 30; 20 MG/100ML; MG/100ML; MG/100ML; MG/100ML
50 INJECTION, SOLUTION INTRAVENOUS CONTINUOUS
Status: DISCONTINUED | OUTPATIENT
Start: 2019-09-03 | End: 2019-09-03 | Stop reason: HOSPADM

## 2019-09-03 RX ORDER — VECURONIUM BROMIDE FOR INJECTION 1 MG/ML
INJECTION, POWDER, LYOPHILIZED, FOR SOLUTION INTRAVENOUS AS NEEDED
Status: DISCONTINUED | OUTPATIENT
Start: 2019-09-03 | End: 2019-09-03 | Stop reason: HOSPADM

## 2019-09-03 RX ORDER — SODIUM CHLORIDE 0.9 % (FLUSH) 0.9 %
5-40 SYRINGE (ML) INJECTION AS NEEDED
Status: DISCONTINUED | OUTPATIENT
Start: 2019-09-03 | End: 2019-09-03 | Stop reason: HOSPADM

## 2019-09-03 RX ORDER — EPHEDRINE SULFATE 50 MG/ML
INJECTION, SOLUTION INTRAVENOUS AS NEEDED
Status: DISCONTINUED | OUTPATIENT
Start: 2019-09-03 | End: 2019-09-03 | Stop reason: HOSPADM

## 2019-09-03 RX ORDER — INSULIN LISPRO 100 [IU]/ML
INJECTION, SOLUTION INTRAVENOUS; SUBCUTANEOUS ONCE
Status: COMPLETED | OUTPATIENT
Start: 2019-09-03 | End: 2019-09-03

## 2019-09-03 RX ORDER — GLYCOPYRROLATE 0.6MG/3ML
SYRINGE (ML) INTRAVENOUS AS NEEDED
Status: DISCONTINUED | OUTPATIENT
Start: 2019-09-03 | End: 2019-09-03 | Stop reason: HOSPADM

## 2019-09-03 RX ORDER — DIPHENHYDRAMINE HYDROCHLORIDE 50 MG/ML
25 INJECTION, SOLUTION INTRAMUSCULAR; INTRAVENOUS
Status: DISCONTINUED | OUTPATIENT
Start: 2019-09-03 | End: 2019-09-03 | Stop reason: HOSPADM

## 2019-09-03 RX ORDER — OXYCODONE AND ACETAMINOPHEN 5; 325 MG/1; MG/1
1 TABLET ORAL AS NEEDED
Status: COMPLETED | OUTPATIENT
Start: 2019-09-03 | End: 2019-09-03

## 2019-09-03 RX ORDER — OXYCODONE AND ACETAMINOPHEN 5; 325 MG/1; MG/1
1 TABLET ORAL
Qty: 24 TAB | Refills: 0 | Status: SHIPPED | OUTPATIENT
Start: 2019-09-03 | End: 2019-09-06

## 2019-09-03 RX ORDER — PROPOFOL 10 MG/ML
INJECTION, EMULSION INTRAVENOUS AS NEEDED
Status: DISCONTINUED | OUTPATIENT
Start: 2019-09-03 | End: 2019-09-03 | Stop reason: HOSPADM

## 2019-09-03 RX ORDER — SODIUM CHLORIDE 0.9 % (FLUSH) 0.9 %
5-40 SYRINGE (ML) INJECTION EVERY 8 HOURS
Status: DISCONTINUED | OUTPATIENT
Start: 2019-09-03 | End: 2019-09-03 | Stop reason: HOSPADM

## 2019-09-03 RX ORDER — ONDANSETRON HYDROCHLORIDE 4 MG/2ML
INJECTION, SOLUTION INTRAMUSCULAR; INTRAVENOUS AS NEEDED
Status: DISCONTINUED | OUTPATIENT
Start: 2019-09-03 | End: 2019-09-03 | Stop reason: HOSPADM

## 2019-09-03 RX ORDER — CEFAZOLIN SODIUM 2 G/50ML
2 SOLUTION INTRAVENOUS
Status: COMPLETED | OUTPATIENT
Start: 2019-09-03 | End: 2019-09-03

## 2019-09-03 RX ORDER — LIDOCAINE HYDROCHLORIDE 20 MG/ML
INJECTION, SOLUTION EPIDURAL; INFILTRATION; INTRACAUDAL; PERINEURAL AS NEEDED
Status: DISCONTINUED | OUTPATIENT
Start: 2019-09-03 | End: 2019-09-03 | Stop reason: HOSPADM

## 2019-09-03 RX ORDER — SUCCINYLCHOLINE CHLORIDE 100 MG/5ML
SYRINGE (ML) INTRAVENOUS AS NEEDED
Status: DISCONTINUED | OUTPATIENT
Start: 2019-09-03 | End: 2019-09-03 | Stop reason: HOSPADM

## 2019-09-03 RX ORDER — INSULIN LISPRO 100 [IU]/ML
INJECTION, SOLUTION INTRAVENOUS; SUBCUTANEOUS
Status: COMPLETED
Start: 2019-09-03 | End: 2019-09-03

## 2019-09-03 RX ORDER — NEOSTIGMINE METHYLSULFATE 1 MG/ML
INJECTION, SOLUTION INTRAVENOUS AS NEEDED
Status: DISCONTINUED | OUTPATIENT
Start: 2019-09-03 | End: 2019-09-03 | Stop reason: HOSPADM

## 2019-09-03 RX ORDER — ONDANSETRON 2 MG/ML
4 INJECTION INTRAMUSCULAR; INTRAVENOUS
Status: DISCONTINUED | OUTPATIENT
Start: 2019-09-03 | End: 2019-09-03 | Stop reason: HOSPADM

## 2019-09-03 RX ORDER — BUPIVACAINE HYDROCHLORIDE AND EPINEPHRINE 5; 5 MG/ML; UG/ML
INJECTION, SOLUTION EPIDURAL; INTRACAUDAL; PERINEURAL AS NEEDED
Status: DISCONTINUED | OUTPATIENT
Start: 2019-09-03 | End: 2019-09-03 | Stop reason: HOSPADM

## 2019-09-03 RX ADMIN — VECURONIUM BROMIDE FOR INJECTION 2 MG: 1 INJECTION, POWDER, LYOPHILIZED, FOR SOLUTION INTRAVENOUS at 11:44

## 2019-09-03 RX ADMIN — LIDOCAINE HYDROCHLORIDE 80 MG: 20 INJECTION, SOLUTION EPIDURAL; INFILTRATION; INTRACAUDAL; PERINEURAL at 11:38

## 2019-09-03 RX ADMIN — Medication 100 MG: at 11:38

## 2019-09-03 RX ADMIN — EPHEDRINE SULFATE 5 MG: 50 INJECTION, SOLUTION INTRAVENOUS at 11:40

## 2019-09-03 RX ADMIN — INSULIN LISPRO 3 UNITS: 100 INJECTION, SOLUTION INTRAVENOUS; SUBCUTANEOUS at 13:12

## 2019-09-03 RX ADMIN — FAMOTIDINE 20 MG: 20 TABLET ORAL at 09:52

## 2019-09-03 RX ADMIN — EPHEDRINE SULFATE 5 MG: 50 INJECTION, SOLUTION INTRAVENOUS at 11:44

## 2019-09-03 RX ADMIN — MIDAZOLAM HYDROCHLORIDE 2 MG: 1 INJECTION, SOLUTION INTRAMUSCULAR; INTRAVENOUS at 11:36

## 2019-09-03 RX ADMIN — PROPOFOL 150 MG: 10 INJECTION, EMULSION INTRAVENOUS at 11:38

## 2019-09-03 RX ADMIN — NEOSTIGMINE METHYLSULFATE 1 MG: 1 INJECTION, SOLUTION INTRAVENOUS at 12:37

## 2019-09-03 RX ADMIN — METOPROLOL TARTRATE 2 MG: 5 INJECTION INTRAVENOUS at 12:01

## 2019-09-03 RX ADMIN — FENTANYL CITRATE 100 MCG: 50 INJECTION, SOLUTION INTRAMUSCULAR; INTRAVENOUS at 11:38

## 2019-09-03 RX ADMIN — OXYCODONE HYDROCHLORIDE AND ACETAMINOPHEN 1 TABLET: 5; 325 TABLET ORAL at 13:35

## 2019-09-03 RX ADMIN — VECURONIUM BROMIDE FOR INJECTION 2 MG: 1 INJECTION, POWDER, LYOPHILIZED, FOR SOLUTION INTRAVENOUS at 11:38

## 2019-09-03 RX ADMIN — CEFAZOLIN SODIUM 2 G: 2 SOLUTION INTRAVENOUS at 11:40

## 2019-09-03 RX ADMIN — KETOROLAC TROMETHAMINE 30 MG: 30 INJECTION, SOLUTION INTRAMUSCULAR; INTRAVENOUS at 12:35

## 2019-09-03 RX ADMIN — ONDANSETRON HYDROCHLORIDE 4 MG: 4 INJECTION, SOLUTION INTRAMUSCULAR; INTRAVENOUS at 12:34

## 2019-09-03 RX ADMIN — Medication 0.2 MG: at 12:37

## 2019-09-03 RX ADMIN — SODIUM CHLORIDE, SODIUM LACTATE, POTASSIUM CHLORIDE, AND CALCIUM CHLORIDE 50 ML/HR: 600; 310; 30; 20 INJECTION, SOLUTION INTRAVENOUS at 10:03

## 2019-09-03 NOTE — H&P
General Surgery Consult        Camilo Haney  Admit date: (Not on file)    MRN: [id number]     : 1950     Age: 68 y.o. Attending Physician: Dk Bautista MD, [facility]        History of Present Illness:      Camilo Haney is a 68 y.o. female who was referred to me for evaluation of a symptomatic incisional hernia. The patient had a laparoscopic left oophorectomy at the end of last year. She stated after the surgery she had bronchitis with severe cough that was relatively chronic. She stated that she felt a sudden pain around the umbilicus and toward the right side and then she saw the bulge. She stated that the bulge has been there since then and has been there for about couple months at least. She denies severe pain but she has discomfort especially at the site of the bulge. She describes the pain as dull and intermittent. She stated that the mass is non reducible. She is morbidly obese.          Patient Active Problem List     Diagnosis Date Noted    S/P laparoscopy 2018    Controlled type 2 diabetes mellitus without complication ([address]     SVT (supraventricular tachycardia) ([address] 2018    Thickened endometrium 10/04/2018    Ovarian mass, left 10/04/2018    Severe obesity (BMI 35.0-39.9) 2018    Cervical polyp 10/15/2015    Atypical glandular cells of undetermined significance ([facility]) on cervical Pap smear 10/08/2015           Past Medical History:   Diagnosis Date    Diabetes ([address]      Hypercholesteremia      Hypertension      Hypothyroid      Menopause      Ovarian mass, left 10/4/2018    S/P laparoscopy 2018    SVT (supraventricular tachycardia) ([address] 2018    TB (tuberculosis)       diagnosed and treated at age 1    Thickened endometrium 10/4/2018            Past Surgical History:   Procedure Laterality Date    HX GYN   2018     Left Salpingo-oopheroectomy and Dx hysteroscopy.      HX TONSILLECTOMY          Social History     Tobacco Use    Smoking status: Current Some Day Smoker       Packs/day: 1.00       Years: 56.00       Pack years: 56.00    Smokeless tobacco: Never Used   Substance Use Topics    Alcohol use: No       Alcohol/week: 0.0 standard drinks      Social History           Tobacco Use   Smoking Status Current Some Day Smoker    Packs/day: 1.00    Years: 56.00    Pack years: 56.00   Smokeless Tobacco Never Used            Family History   Problem Relation Age of Onset    Breast Cancer Sister 46    Breast Cancer Sister             Current Outpatient Medications   Medication Sig    metFORMIN (GLUCOPHAGE) 500 mg tablet Take 500 mg by mouth daily (with breakfast).  levothyroxine (SYNTHROID) 125 mcg tablet Take 125 mcg by mouth daily.  metoprolol succinate (TOPROL-XL) 25 mg XL tablet Take 25 mg by mouth daily.  simvastatin (ZOCOR) 20 mg tablet      oxyCODONE-acetaminophen (PERCOCET) 5-325 mg per tablet Take 1-2 Tabs by mouth every six (6) hours as needed for Pain. Max Daily Amount: 8 Tabs.  ibuprofen (MOTRIN) 800 mg tablet Take 1 Tab by mouth every eight (8) hours as needed for Pain.      No current facility-administered medications for this visit.        No Known Allergies      Review of Systems:  Constitutional: negative  Eyes: negative  Ears, Nose, Mouth, Throat, and Face: negative  Respiratory: negative  Cardiovascular: negative  Gastrointestinal: positive for abdominal pain and Abdominal wall bulge  Genitourinary:negative  Integument/Breast: negative  Hematologic/Lymphatic: negative  Musculoskeletal:negative  Neurological: negative  Behavioral/Psychiatric: negative  Endocrine: negative  Allergic/Immunologic: negative     Objective:      Visit Vitals  /71 (BP 1 Location: Right arm, BP Patient Position: At rest)   Pulse (!) 56   Temp 96.6 °F (35.9 °C) (Oral)   Resp 20   Ht 5' 7\" (1.702 m)   Wt 110.2 kg (243 lb)   BMI 38.06 kg/m²         Physical Exam:       General:  in no apparent distress, alert, oriented times 3 and cooperative   Eyes:  conjunctivae and sclerae normal, pupils equal, round, reactive to light   Throat & Neck: no erythema or exudates noted and neck supple and symmetrical; no palpable masses   Lungs:   clear to auscultation bilaterally   Heart:  Regular rate and rhythm   Abdomen:   rounded, soft, nontender except for tenderness at a bulge located in the right lower quadrant at the site of the scar from her previous laparoscopic oophorectomy, nondistended, no other masses or organomegaly. The hernia is nontender but only partially reducible.    Extremities: extremities normal, atraumatic, no cyanosis or edema   Skin: Normal.       Imaging and Lab Review:      CBC:         Lab Results   Component Value Date/Time     WBC 8.7 11/28/2018 10:20 AM     RBC 4.62 11/28/2018 10:20 AM     HGB 14.3 11/28/2018 10:20 AM     HCT 42.7 11/28/2018 10:20 AM     PLATELET 543 61/96/6211 10:20 AM      BMP:         Lab Results   Component Value Date/Time     Glucose 142 (H) 11/28/2018 10:20 AM     Sodium 143 11/28/2018 10:20 AM     Potassium 4.3 11/28/2018 10:20 AM     Chloride 108 11/28/2018 10:20 AM     CO2 26 11/28/2018 10:20 AM     BUN 18 11/28/2018 10:20 AM     Creatinine 0.88 11/28/2018 10:20 AM     Calcium 8.6 11/28/2018 10:20 AM      CMP:        Lab Results   Component Value Date/Time     Glucose 142 (H) 11/28/2018 10:20 AM     Sodium 143 11/28/2018 10:20 AM     Potassium 4.3 11/28/2018 10:20 AM     Chloride 108 11/28/2018 10:20 AM     CO2 26 11/28/2018 10:20 AM     BUN 18 11/28/2018 10:20 AM     Creatinine 0.88 11/28/2018 10:20 AM     Calcium 8.6 11/28/2018 10:20 AM     Anion gap 9 11/28/2018 10:20 AM     BUN/Creatinine ratio 20 11/28/2018 10:20 AM         Recent Results   No results found for this or any previous visit (from the past 24 hour(s)).        images and reports reviewed     Assessment:   Lynne Jolly is a 68 y.o. female is presenting with a picture of symptomatic incisional hernia in the right lower quadrant. I Discussed the possibility of incarceration, strangulation, enlargement in size over time, and the risk of emergency surgery in the face of strangulation. I also discussed the use of prosthetic materials (mesh), including the risk of infection. Also discussed the risk of surgery including recurrence and the possible need for reoperation and removal of mesh if used, possibility of postoperative small bowel injury, obstruction or ileus, and the risks of general anesthetic. I explained to the the patient about the robotic hernia repair procedure.      Plan:      Robotic incisional hernia repair with placement of mesh.

## 2019-09-03 NOTE — ANESTHESIA POSTPROCEDURE EVALUATION
Procedure(s):  Davinci  Repair of Incisional Hernia with Mesh. general    Anesthesia Post Evaluation      Multimodal analgesia: multimodal analgesia used between 6 hours prior to anesthesia start to PACU discharge  Patient location during evaluation: bedside  Patient participation: complete - patient participated  Level of consciousness: awake  Pain management: adequate  Airway patency: patent  Anesthetic complications: no  Cardiovascular status: stable  Respiratory status: acceptable  Hydration status: acceptable  Post anesthesia nausea and vomiting:  controlled      Vitals Value Taken Time   /73 9/3/2019  1:41 PM   Temp 36.1 °C (97 °F) 9/3/2019 12:51 PM   Pulse 48 9/3/2019  1:46 PM   Resp 8 9/3/2019  1:46 PM   SpO2 85 % 9/3/2019  1:47 PM   Vitals shown include unvalidated device data.

## 2019-09-03 NOTE — PERIOP NOTES
Pre-Op Summary    Pt arrived via car with family/friend and is oriented to time, place, person and situation. Patient with steady gait with none assistive devices. Visit Vitals  /67 (BP 1 Location: Right arm, BP Patient Position: At rest)   Pulse 64   Temp 97.8 °F (36.6 °C)   Resp 16   Ht 5' 7\" (1.702 m)   Wt 106.3 kg (234 lb 5 oz)   SpO2 98%   BMI 36.70 kg/m²       Peripheral IV located on Left hand . Patients belongings are located with son. Patient's point of contact is Barneyhaseeb Mcmullen and their contact number is: NA. They will be in the waiting room. They are able to receive medication information. They will be their ride home.

## 2019-09-03 NOTE — DISCHARGE INSTRUCTIONS
Discharge Instructions Following Surgery    Patient: Karma Tan MRN: [id number]  SSN: [id number]    YOB: 1950  Age: 68 y.o. Sex: female      Activity  · As tolerated, walking encourage, stairs are okay. · Avoid strenuous activities - no lifting anything heavier than 15 pounds till seen in the clinic. · You may shower at home after 48 hours. Diet  · Regular diet after nausea from the anesthetic has passed. Pain  · Take pain medication as directed by your doctor. Please add Aleve or Ibuprofen as needed (If no contraindications for these types of medications). ·  Call your doctor if pain is NOT relieved by medication. Wound and Dressing Care  · There is glue on the wounds. No need for any dressing care. · Apply ice packs to the area of the surgery (like in inguinal hernia apply to the groin not the incisions) for the first 1 to 2 days  · Apply warm compresses after 2 days for pain relieve if needed    After Anesthesia  · For the first 24 hours: DO NOT Drive, Drink alcoholic beverages, or Make important decisions. · Be aware of dizziness following anesthesia and while taking pain medication. Call your doctor if  · Excessive bleeding that does not stop after holding mild pressure over the area. · Temperature of 101 degrees F or above. · Redness,excessive swelling or bruising, and/or green or yellow, smelly discharge from incision. · If nausea and vomiting continues. Appointment date/time Follow-Up Phone Calls    · Call the office at [phone] to make your follow-up appointment in 2 weeks after the surgery (if not already set up) . Dr. Denise Lucas cell phone number is [phone]. Please call me if you have any concerns or questions.      DISCHARGE SUMMARY from Nurse    PATIENT INSTRUCTIONS:    After general anesthesia or intravenous sedation, for 24 hours or while taking prescription Narcotics:  · Limit your activities  · Do not drive and operate hazardous machinery  · Do not make important personal or business decisions  · Do  not drink alcoholic beverages  · If you have not urinated within 8 hours after discharge, please contact your surgeon on call. Report the following to your surgeon:  · Excessive pain, swelling, redness or odor of or around the surgical area  · Temperature over 100.5  · Nausea and vomiting lasting longer than 4 hours or if unable to take medications  · Any signs of decreased circulation or nerve impairment to extremity: change in color, persistent  numbness, tingling, coldness or increase pain  · Any questions    What to do at Home:  Recommended activity: Activity as tolerated and no driving for today,  These are general instructions for a healthy lifestyle:    No smoking/ No tobacco products/ Avoid exposure to second hand smoke  Surgeon General's Warning:  Quitting smoking now greatly reduces serious risk to your health. Obesity, smoking, and sedentary lifestyle greatly increases your risk for illness    A healthy diet, regular physical exercise & weight monitoring are important for maintaining a healthy lifestyle    You may be retaining fluid if you have a history of heart failure or if you experience any of the following symptoms:  Weight gain of 3 pounds or more overnight or 5 pounds in a week, increased swelling in our hands or feet or shortness of breath while lying flat in bed. Please call your doctor as soon as you notice any of these symptoms; do not wait until your next office visit. The discharge information has been reviewed with the patient. The patient verbalized understanding. Discharge medications reviewed with the patient and appropriate educational materials and side effects teaching were provided. ___________________________________________________________________________________________________________________________________  Patient armband removed and given to patient to take home.   Patient was informed of the privacy risks if armband lost or stolen

## 2019-09-03 NOTE — BRIEF OP NOTE
BRIEF OPERATIVE NOTE    Date of Procedure: 9/3/2019   Preoperative Diagnosis: Incisional Hernia K43.2  Postoperative Diagnosis: Incisional Hernia K43.2    Procedure(s):  Davinci  Repair of Incisional Hernia with Mesh  Surgeon(s) and Role:     * Lilliana Cline MD - Primary  Surgical Staff:  Circ-1: Kye Erwin  Circ-Relief: Donna Mosley RN  Scrub Tech-1: [place] Risser  Surg Asst-1: Isabell Kimberley  Event Time In Time Out   Incision Start 1144    Incision Close 1242      Anesthesia: General   Estimated Blood Loss: Minimal.  Specimens: * No specimens in log *   Findings: Severe adhesions. Complications: None. Implants:   Implant Name Type Inv.  Item Serial No.  Lot No. LRB No. Used Action   MESH [facility] ELLIPTICAL Trey Maid S/T - [id number]  MESH YUNG ELLIPTICAL 4X6IN -- VENTRALIGHT S/T  BARD ZAID [id number] N/A 1 Implanted

## 2019-09-03 NOTE — PROGRESS NOTES
Date of Surgery Update:  Carolan Gottron was seen and examined. History and physical has been reviewed. The patient has been examined. There have been no significant clinical changes since the completion of the originally dated History and Physical. Will proceed with robotic incisional hernia repair with mesh.      Signed By: Viridiana Person MD     September 3, 2019 11:06 AM

## 2019-09-03 NOTE — ANESTHESIA PREPROCEDURE EVALUATION
Relevant Problems   No relevant active problems       Anesthetic History   No history of anesthetic complications            Review of Systems / Medical History  Patient summary reviewed, nursing notes reviewed and pertinent labs reviewed    Pulmonary  Within defined limits                 Neuro/Psych   Within defined limits           Cardiovascular    Hypertension: well controlled        Dysrhythmias : SVT      Exercise tolerance: >4 METS     GI/Hepatic/Renal  Within defined limits              Endo/Other    Diabetes  Hypothyroidism: well controlled  Obesity     Other Findings              Physical Exam    Airway  Mallampati: II  TM Distance: 4 - 6 cm  Neck ROM: normal range of motion   Mouth opening: Normal     Cardiovascular  Regular rate and rhythm,  S1 and S2 normal,  no murmur, click, rub, or gallop  Rhythm: regular  Rate: normal         Dental    Dentition: Poor dentition     Pulmonary  Breath sounds clear to auscultation               Abdominal  GI exam deferred       Other Findings            Anesthetic Plan    ASA: 3  Anesthesia type: general          Induction: Intravenous  Anesthetic plan and risks discussed with: Patient

## 2019-09-04 NOTE — OP NOTES
[facility]  OPERATIVE REPORT    Name:  Nick Sidhu  MR#:   [id number]  :  1950  ACCOUNT #:  [de-identified]  DATE OF SERVICE:  2019    PREOPERATIVE DIAGNOSIS:  Incisional hernia. POSTOPERATIVE DIAGNOSIS:  Incisional hernia. PROCEDURE PERFORMED:  Robotic repair of incisional hernia with placement of mesh with extensive lysis of adhesion, more than 50% of the time of the surgery. SURGEON:  Lillian Montalvo MD    ASSISTANT:  Stepan Masterson. ANESTHESIA:  General.    COMPLICATIONS:  None. SPECIMENS REMOVED:  None. IMPLANTS:  Ventralight Bard mesh 4 x 6 inches. ESTIMATED BLOOD LOSS:  Minimal.    PROCEDURE:  The patient was brought to operating room. Anesthesia was induced. Scrubbing and draping of the abdomen was done in usual manner. A time-out was performed. A skin incision in the left upper quadrant was performed. Veress needle was inserted. Abdomen was insufflated. After insufflating the abdomen, there was a problem with the scope. We had to wait for about 10 minutes to get a new scope and then the abdomen was reinsufflated again and then an 8 mm port was placed and then the abdomen was explored. There was a small bowel stuck inside the hernia on the right side of the abdominal wall. Two  other 8 mm ports were placed under direct visualization. At this point, the robot was docked. There was significant amount of small bowel inside the hernia sac and after we were able to reduce it with gentle pressure, there was significant adhesion between a small segment of the small bowel and peritoneum. I am not sure if there was some type of a bowel injury at that point and that is why there was significant adhesion. I had to use a Metzenbaum and do extensive lysis of adhesion to take this part of the small bowel from the abdominal wall.   After reducing this area, I was able to identify the defect which was relatively small about 3 cm in size but the hernia sac was very large.  I dissected the hernia sac partially. I was unable to remove all of it, so I just identified the fascial edges. At this point, the defect was closed with two layers, first the anterior rectus sheath was closed with a running #0 V-Loc suture in two layers itself and then the posterior rectus sheath was closed with a #0 6-inch V-Loc suture in two layers as well and then a 4 x 6-inch Ventralight Bard mesh was placed inside the abdomen with the rough side towards the abdominal wall and the smooth side towards the bowel. This was fixed with 2-0 V-Loc 12-inch sutures, three of them in a running fashion. Hemostasis was secured. Instruments were removed. The robot was undocked and the skin incisions were closed with 4-0 Monocryl.         Matthew Minaya MD      [id number]  D:  09/03/2019 12:45  T:  09/03/2019 12:51  JOB #:  [id number]

## 2019-09-16 ENCOUNTER — OFFICE VISIT (OUTPATIENT)
Dept: SURGERY | Age: 69
End: 2019-09-16

## 2019-09-16 VITALS
WEIGHT: 236 LBS | OXYGEN SATURATION: 95 % | DIASTOLIC BLOOD PRESSURE: 85 MMHG | HEART RATE: 52 BPM | HEIGHT: 67 IN | TEMPERATURE: 96.8 F | BODY MASS INDEX: 37.04 KG/M2 | SYSTOLIC BLOOD PRESSURE: 125 MMHG

## 2019-09-16 DIAGNOSIS — Z87.19 S/P HERNIA REPAIR: Primary | ICD-10-CM

## 2019-09-16 DIAGNOSIS — Z98.890 S/P HERNIA REPAIR: Primary | ICD-10-CM

## 2019-09-16 DIAGNOSIS — Z09 POSTOPERATIVE EXAMINATION: ICD-10-CM

## 2019-09-16 NOTE — PROGRESS NOTES
Linwood Dey is a 68 y.o. female (: 1950) presenting to address:    Chief Complaint   Patient presents with    Surgical Follow-up     Incisional hernia       Medication list and allergies have been reviewed with Linwood Dey and updated as of today's date. I have gone over all Medical, Surgical and Social History with Linwood Dey and updated/added the information accordingly. 1. Have you been to the ER, Urgent Care or Hospitalized since your last visit? NO      2. Have you followed up with your PCP or any other Physicians since your procedure/ last office visit?    NO

## 2019-09-16 NOTE — PROGRESS NOTES
Patient seen and examined. She is doing great. Her abdomen is soft and non-tender and her wounds are healing well. Follow-up as needed.

## 2020-10-19 ENCOUNTER — HOSPITAL ENCOUNTER (OUTPATIENT)
Dept: MAMMOGRAPHY | Age: 70
Discharge: HOME | End: 2020-10-19
Attending: INTERNAL MEDICINE
Payer: MEDICARE

## 2020-10-19 DIAGNOSIS — Z12.31 VISIT FOR SCREENING MAMMOGRAM: ICD-10-CM

## 2020-10-19 PROCEDURE — 77063 BREAST TOMOSYNTHESIS BI: CPT

## 2023-02-13 NOTE — PERIOP NOTES
Phase 2 Recovery Summary  Patient arrived to Phase 2 at 1350. Report received from PACU DANIELLE Heredia  Patient tolerating liquids well. Patient is able to keep crackers down. Son is at bedside helping patient dress. Offered assistance and states that she is okay. Vitals:    09/03/19 1306 09/03/19 1311 09/03/19 1328 09/03/19 1340   BP: 146/60 136/57 145/56    Pulse: (!) 46 (!) 43 (!) 53 (!) 47   Resp: 17 14 21 11   Temp:       SpO2: 97% 98% 90% 97%   Weight:       Height:           oriented to time, place, person and situation    Lines and Drains  Peripheral Intravenous Line:   Peripheral IV 09/03/19 Left Hand (Active)   Site Assessment Clean, dry, & intact 9/3/2019 12:56 PM   Phlebitis Assessment 0 9/3/2019 12:56 PM   Infiltration Assessment 0 9/3/2019 12:56 PM   Dressing Status Clean, dry, & intact 9/3/2019 12:56 PM   Dressing Type Transparent;Tape 9/3/2019 12:56 PM   Hub Color/Line Status Infusing;Pink 9/3/2019 12:56 PM   Alcohol Cap Used Yes 9/3/2019  9:54 AM       Wound  Wound Abdomen (Active)   Number of days: 279       Wound Abdomen (Active)   Dressing Status Clean, dry, and intact 9/3/2019 12:59 PM   Dressing Type Topical skin adhesive/glue 9/3/2019 12:59 PM   Incision Site Well Approximated Yes 9/3/2019 12:45 PM   Number of days: 0        Discharge discussed with patient and son without any questions or concerns. Patient discharged to home with son at 0.     Griselda Sanchez RN Preceptor Review: I saw and evaluated patient. I discussed case with resident. I agree with the resident's findings and plan, as documented in today's note. Tamika Folwers M.D.

## (undated) DEVICE — SOL IRR NACL 0.9% 500ML POUR --

## (undated) DEVICE — SEALER LAP L37CM SHFT DIA10MM TISS FUS HAND/FOOT SWCH BLNT

## (undated) DEVICE — TIP COVER ACCESSORY

## (undated) DEVICE — DERMABOND SKIN ADH 0.7ML -- DERMABOND ADVANCED 12/BX

## (undated) DEVICE — KENDALL SCD EXPRESS SLEEVES, KNEE LENGTH, MEDIUM: Brand: KENDALL SCD

## (undated) DEVICE — NEEDLE HYPO 25GA L1.5IN BVL ORIENTED ECLIPSE

## (undated) DEVICE — SOL INJ L R 1000ML BG --

## (undated) DEVICE — ANTI-FOG SOLUTION WITH FOAM PAD: Brand: DEVON

## (undated) DEVICE — DRAPE,UTILITY,TAPE,15X26,STERILE: Brand: MEDLINE

## (undated) DEVICE — TABLE COVER: Brand: CONVERTORS

## (undated) DEVICE — TROCAR ENDOSCP L100MM DIA12MM STBL SL BLDELSS ENDOPATH XCEL

## (undated) DEVICE — SUTURE ABSRB L6IN L37MM 0 GS-21 GRN 1/2 CIR TAPR PNT NDL VLOCL0306

## (undated) DEVICE — (D)PREP SKN CHLRAPRP APPL 26ML -- CONVERT TO ITEM 371833

## (undated) DEVICE — DEVICE TISS REM IU CANSTR VAC TB FT PEDAL DISPOSABLE MYOSURE

## (undated) DEVICE — TOWEL SURG W16XL26IN BLU NONFENESTRATED DLX ST 2 PER PK

## (undated) DEVICE — STERILE LATEX POWDER-FREE SURGICAL GLOVESWITH NITRILE COATING: Brand: PROTEXIS

## (undated) DEVICE — COVER LT HNDL BLU PLAS

## (undated) DEVICE — Device

## (undated) DEVICE — TROCAR ENDOSCP SHFT L100MM DIA12MM INTEGR STBL ENDOPATH

## (undated) DEVICE — TRAY PREP DRY W/ PREM GLV 2 APPL 6 SPNG 2 UNDPD 1 OVERWRAP

## (undated) DEVICE — LEGGINGS, PAIR, 31X48, STERILE: Brand: MEDLINE

## (undated) DEVICE — REM POLYHESIVE ADULT PATIENT RETURN ELECTRODE: Brand: VALLEYLAB

## (undated) DEVICE — STERILE POLYISOPRENE POWDER-FREE SURGICAL GLOVES WITH EMOLLIENT COATING: Brand: PROTEXIS

## (undated) DEVICE — SYR 10ML CTRL LR LCK NSAF LF --

## (undated) DEVICE — SET SUCT IRR TIP DISP STRYKEFLOW2

## (undated) DEVICE — MORCELLATOR HYSTEROSCOPIC DEV DST MRK 5MM FOR USE W 5C SYS

## (undated) DEVICE — MEDI-VAC NON-CONDUCTIVE SUCTION TUBING 6MM X 6.1M (20 FT.) L: Brand: CARDINAL HEALTH

## (undated) DEVICE — BARRIER TISS ADH ABSRB 3X4IN -- GYNECARE INTERCEED

## (undated) DEVICE — INTENDED FOR TISSUE SEPARATION, AND OTHER PROCEDURES THAT REQUIRE A SHARP SURGICAL BLADE TO PUNCTURE OR CUT.: Brand: BARD-PARKER ® STAINLESS STEEL BLADES

## (undated) DEVICE — SUTURE ABSRB L12IN L12MM SZ 2-0 GS-22 VLT GLYCOLIDE VLOCM2115

## (undated) DEVICE — SUTURE VCRL SZ 2-0 L27IN ABSRB UD L26MM SH 1/2 CIR J417H

## (undated) DEVICE — TUBE ST FLD AQUILEX OUTFLO

## (undated) DEVICE — SPECIMEN RETRIEVAL POUCH: Brand: ENDO CATCH GOLD

## (undated) DEVICE — COLUMN DRAPE

## (undated) DEVICE — HYSTEROSCOPIC PROCEDURE KIT: Brand: QUICKPICK

## (undated) DEVICE — SUTURE MCRYL SZ 4-0 L18IN ABSRB UD L19MM PS-2 3/8 CIR PRIM Y496G

## (undated) DEVICE — BLADELESS OBTURATOR

## (undated) DEVICE — STERILE POLYISOPRENE POWDER-FREE SURGICAL GLOVES: Brand: PROTEXIS

## (undated) DEVICE — PACK LAPSCP CHOLE LF CUST --

## (undated) DEVICE — TRAY CATH 16F URIN MTR LTX -- CONVERT TO ITEM 363111

## (undated) DEVICE — VISUALIZATION SYSTEM: Brand: CLEARIFY

## (undated) DEVICE — BLADELESS OPTICAL TROCAR WITH FIXATION CANNULA: Brand: VERSAPORT

## (undated) DEVICE — 1016 S-DRAPE IRRIG POUCH 10/BOX: Brand: STERI-DRAPE™

## (undated) DEVICE — SUTURE SZ 0 27IN 5/8 CIR UR-6  TAPER PT VIOLET ABSRB VICRYL J603H

## (undated) DEVICE — LAPAROSCOPIC SMOKE FILTRATION SYSTEM: Brand: PALL LAPAROSHIELD® PLUS LAPAROSCOPIC SMOKE FILTRATION SYSTEM

## (undated) DEVICE — UNIVERSAL FIXATION CANNULA: Brand: VERSAONE

## (undated) DEVICE — INTENDED FOR TISSUE SEPARATION, AND OTHER PROCEDURES THAT REQUIRE A SHARP SURGICAL BLADE TO PUNCTURE OR CUT.: Brand: BARD-PARKER ® CARBON RIB-BACK BLADES

## (undated) DEVICE — DRAPE,REIN 53X77,STERILE: Brand: MEDLINE

## (undated) DEVICE — ARM DRAPE

## (undated) DEVICE — LIGHT HANDLE: Brand: DEVON

## (undated) DEVICE — [HIGH FLOW INSUFFLATOR,  DO NOT USE IF PACKAGE IS DAMAGED,  KEEP DRY,  KEEP AWAY FROM SUNLIGHT,  PROTECT FROM HEAT AND RADIOACTIVE SOURCES.]: Brand: PNEUMOSURE

## (undated) DEVICE — SEAL UNIV 5-8MM DISP BX/10 -- DA VINCI XI - SNGL USE